# Patient Record
Sex: MALE | Race: WHITE | Employment: PART TIME | URBAN - METROPOLITAN AREA
[De-identification: names, ages, dates, MRNs, and addresses within clinical notes are randomized per-mention and may not be internally consistent; named-entity substitution may affect disease eponyms.]

---
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
## 2019-02-05 ENCOUNTER — HOSPITAL ENCOUNTER (EMERGENCY)
Facility: CLINIC | Age: 19
Discharge: HOME | End: 2019-02-05
Attending: EMERGENCY MEDICINE
Payer: COMMERCIAL

## 2019-02-05 VITALS
TEMPERATURE: 98.1 F | OXYGEN SATURATION: 99 % | SYSTOLIC BLOOD PRESSURE: 138 MMHG | HEIGHT: 70 IN | WEIGHT: 165 LBS | DIASTOLIC BLOOD PRESSURE: 78 MMHG | HEART RATE: 80 BPM | BODY MASS INDEX: 23.62 KG/M2 | RESPIRATION RATE: 16 BRPM

## 2019-02-05 DIAGNOSIS — R59.1 LYMPHADENOPATHY: Primary | ICD-10-CM

## 2019-02-05 PROCEDURE — 6370000000 HC RX 637 (ALT 250 FOR IP): Performed by: EMERGENCY MEDICINE

## 2019-02-05 PROCEDURE — 99283 EMERGENCY DEPT VISIT LOW MDM: CPT

## 2019-02-05 RX ORDER — IBUPROFEN 600 MG/1
600 TABLET ORAL ONCE
Status: COMPLETED | OUTPATIENT
Start: 2019-02-05 | End: 2019-02-05

## 2019-02-05 RX ORDER — IBUPROFEN 600 MG/1
600 TABLET ORAL EVERY 6 HOURS PRN
Qty: 120 TABLET | Refills: 0 | Status: SHIPPED | OUTPATIENT
Start: 2019-02-05

## 2019-02-05 RX ADMIN — IBUPROFEN 600 MG: 600 TABLET ORAL at 21:35

## 2019-02-05 ASSESSMENT — PAIN SCALES - GENERAL
PAINLEVEL_OUTOF10: 4
PAINLEVEL_OUTOF10: 4

## 2019-02-05 ASSESSMENT — PAIN DESCRIPTION - DESCRIPTORS: DESCRIPTORS: RADIATING

## 2019-02-05 ASSESSMENT — ENCOUNTER SYMPTOMS
EYES NEGATIVE: 1
GASTROINTESTINAL NEGATIVE: 1
RESPIRATORY NEGATIVE: 1

## 2019-02-05 ASSESSMENT — PAIN DESCRIPTION - PAIN TYPE: TYPE: ACUTE PAIN

## 2019-02-05 ASSESSMENT — PAIN DESCRIPTION - FREQUENCY: FREQUENCY: INTERMITTENT

## 2019-02-05 ASSESSMENT — PAIN DESCRIPTION - ORIENTATION: ORIENTATION: RIGHT

## 2019-02-05 ASSESSMENT — PAIN DESCRIPTION - LOCATION: LOCATION: ABDOMEN

## 2019-02-13 ENCOUNTER — HOSPITAL ENCOUNTER (OUTPATIENT)
Age: 19
Setting detail: SPECIMEN
Discharge: HOME | End: 2019-02-13
Payer: COMMERCIAL

## 2019-02-13 ENCOUNTER — OFFICE VISIT (OUTPATIENT)
Dept: FAMILY MEDICINE CLINIC | Age: 19
End: 2019-02-13
Payer: COMMERCIAL

## 2019-02-13 VITALS
HEIGHT: 70 IN | RESPIRATION RATE: 18 BRPM | HEART RATE: 74 BPM | BODY MASS INDEX: 22.9 KG/M2 | WEIGHT: 160 LBS | TEMPERATURE: 98 F | SYSTOLIC BLOOD PRESSURE: 116 MMHG | DIASTOLIC BLOOD PRESSURE: 74 MMHG

## 2019-02-13 DIAGNOSIS — Z00.00 ENCOUNTER FOR MEDICAL EXAMINATION TO ESTABLISH CARE: ICD-10-CM

## 2019-02-13 DIAGNOSIS — N50.811 RIGHT TESTICULAR PAIN: ICD-10-CM

## 2019-02-13 DIAGNOSIS — Z11.4 SCREENING FOR HIV (HUMAN IMMUNODEFICIENCY VIRUS): ICD-10-CM

## 2019-02-13 DIAGNOSIS — H53.9 VISUAL DISTURBANCE: ICD-10-CM

## 2019-02-13 DIAGNOSIS — F25.9 SCHIZO AFFECTIVE SCHIZOPHRENIA (HCC): ICD-10-CM

## 2019-02-13 DIAGNOSIS — R59.0 INGUINAL LYMPHADENOPATHY: Primary | ICD-10-CM

## 2019-02-13 DIAGNOSIS — R59.0 INGUINAL LYMPHADENOPATHY: ICD-10-CM

## 2019-02-13 LAB
BILIRUBIN URINE: NEGATIVE
COLOR: YELLOW
COMMENT UA: ABNORMAL
GLUCOSE URINE: NEGATIVE
KETONES, URINE: NEGATIVE
LEUKOCYTE ESTERASE, URINE: NEGATIVE
NITRITE, URINE: NEGATIVE
PH UA: 8.5 (ref 5–8)
PROTEIN UA: NEGATIVE
SPECIFIC GRAVITY UA: 1.02 (ref 1–1.03)
TURBIDITY: CLEAR
URINE HGB: NEGATIVE
UROBILINOGEN, URINE: NORMAL

## 2019-02-13 PROCEDURE — 99204 OFFICE O/P NEW MOD 45 MIN: CPT | Performed by: NURSE PRACTITIONER

## 2019-02-13 ASSESSMENT — ENCOUNTER SYMPTOMS
EYE ITCHING: 0
VOMITING: 0
TROUBLE SWALLOWING: 0
CONSTIPATION: 1
BLOOD IN STOOL: 0
EYE PAIN: 0
DIARRHEA: 0
COLOR CHANGE: 0
NAUSEA: 0
CHOKING: 0
CHEST TIGHTNESS: 0
PHOTOPHOBIA: 0
COUGH: 0
ABDOMINAL PAIN: 0
VOICE CHANGE: 0
EYE REDNESS: 0
SHORTNESS OF BREATH: 0
EYE DISCHARGE: 0

## 2019-02-13 ASSESSMENT — PATIENT HEALTH QUESTIONNAIRE - PHQ9
1. LITTLE INTEREST OR PLEASURE IN DOING THINGS: 0
2. FEELING DOWN, DEPRESSED OR HOPELESS: 0
SUM OF ALL RESPONSES TO PHQ QUESTIONS 1-9: 0
SUM OF ALL RESPONSES TO PHQ QUESTIONS 1-9: 0
SUM OF ALL RESPONSES TO PHQ9 QUESTIONS 1 & 2: 0

## 2019-02-14 LAB
C. TRACHOMATIS DNA ,URINE: NEGATIVE
N. GONORRHOEAE DNA, URINE: NEGATIVE
SPECIMEN DESCRIPTION: NORMAL

## 2019-02-19 DIAGNOSIS — R59.9 ENLARGED LYMPH NODE: Primary | ICD-10-CM

## 2019-02-20 ENCOUNTER — TELEPHONE (OUTPATIENT)
Dept: FAMILY MEDICINE CLINIC | Age: 19
End: 2019-02-20

## 2019-02-20 DIAGNOSIS — R59.0 INGUINAL LYMPHADENOPATHY: Primary | ICD-10-CM

## 2019-03-21 ENCOUNTER — HOSPITAL ENCOUNTER (OUTPATIENT)
Dept: ULTRASOUND IMAGING | Facility: CLINIC | Age: 19
Discharge: HOME | End: 2019-03-23
Payer: COMMERCIAL

## 2019-03-21 DIAGNOSIS — R59.0 INGUINAL LYMPHADENOPATHY: ICD-10-CM

## 2019-03-21 PROCEDURE — 76857 US EXAM PELVIC LIMITED: CPT

## 2019-04-05 ENCOUNTER — TELEPHONE (OUTPATIENT)
Dept: FAMILY MEDICINE CLINIC | Age: 19
End: 2019-04-05

## 2019-04-05 ENCOUNTER — OFFICE VISIT (OUTPATIENT)
Dept: FAMILY MEDICINE CLINIC | Age: 19
End: 2019-04-05
Payer: COMMERCIAL

## 2019-04-05 VITALS
DIASTOLIC BLOOD PRESSURE: 74 MMHG | HEART RATE: 76 BPM | BODY MASS INDEX: 21.52 KG/M2 | TEMPERATURE: 98.4 F | WEIGHT: 150 LBS | RESPIRATION RATE: 16 BRPM | SYSTOLIC BLOOD PRESSURE: 118 MMHG

## 2019-04-05 DIAGNOSIS — R10.84 ABDOMINAL PAIN, CHRONIC, GENERALIZED: Primary | ICD-10-CM

## 2019-04-05 DIAGNOSIS — G89.29 ABDOMINAL PAIN, CHRONIC, GENERALIZED: Primary | ICD-10-CM

## 2019-04-05 DIAGNOSIS — R59.0 INGUINAL LYMPHADENOPATHY: ICD-10-CM

## 2019-04-05 PROCEDURE — 99213 OFFICE O/P EST LOW 20 MIN: CPT | Performed by: NURSE PRACTITIONER

## 2019-04-05 NOTE — PROGRESS NOTES
tablet Take 1 tablet by mouth every 6 hours as needed for Pain 120 tablet 0     No current facility-administered medications for this visit. HPI    Adrienne Wright enlarged lymph node. Did have an ultrasound done which showed that was benign-appearing most likely inflammatory. Tells me that this node went down some and then he contracted flu 2 days ago and it returned. Has lost 10 pounds in the last month. Tells me he has been walking 1 mile to work every day. Working out more. Eating healthier. Feels like he gets full relief asked. Tells me that his bowels were normal.  Since he got a girlfriend. Now that she is in detention. He is constipated again. He believes that his bowels are stress related. Review of Systems   Constitutional: Negative for activity change, appetite change, chills, diaphoresis, fatigue and fever. HENT: Negative for dental problem, ear discharge, ear pain, trouble swallowing and voice change. Eyes: Positive for visual disturbance. Negative for photophobia, pain, discharge, redness and itching. Respiratory: Negative for cough, choking, chest tightness and shortness of breath. Cardiovascular: Negative for chest pain, palpitations and leg swelling. Gastrointestinal: Positive for constipation. Negative for abdominal pain, blood in stool, diarrhea, nausea and vomiting. Endocrine: Negative. Genitourinary: Positive for testicular pain (right at times. random. ). Negative for difficulty urinating, discharge, dysuria, flank pain, frequency, hematuria, penile pain and penile swelling. Musculoskeletal: Negative for arthralgias, gait problem and joint swelling. Skin: Negative for color change and rash. Allergic/Immunologic: Negative for immunocompromised state. Neurological: Positive for dizziness and headaches. Negative for syncope and light-headedness. Hematological: Negative for adenopathy. Does not bruise/bleed easily.    Psychiatric/Behavioral: Positive for dysphoric mood (HX. scizo affective. ). Negative for decreased concentration, sleep disturbance and suicidal ideas. The patient is not nervous/anxious. Objective:   Physical Exam   Constitutional: He is oriented to person, place, and time. He appears well-developed and well-nourished. No distress. HENT:   Head: Normocephalic and atraumatic. Right Ear: External ear normal.   Left Ear: External ear normal.   Nose: Nose normal.   Mouth/Throat: Oropharynx is clear and moist and mucous membranes are normal. No oropharyngeal exudate. Eyes: Pupils are equal, round, and reactive to light. Conjunctivae are normal. Right eye exhibits no discharge. Left eye exhibits no discharge. Neck: Normal range of motion. Neck supple. No JVD present. Cardiovascular: Normal rate and regular rhythm. No murmur heard. Pulses:       Carotid pulses are 2+ on the right side, and 2+ on the left side. Radial pulses are 2+ on the right side, and 2+ on the left side. Posterior tibial pulses are 2+ on the right side, and 2+ on the left side. Pulmonary/Chest: Effort normal and breath sounds normal. No accessory muscle usage. No respiratory distress. He has no decreased breath sounds. He has no wheezes. Abdominal: Soft. Bowel sounds are normal. He exhibits no distension. There is no tenderness. There is no rebound, no guarding and no CVA tenderness. Hernia confirmed negative in the right inguinal area and confirmed negative in the left inguinal area. Musculoskeletal: He exhibits no edema or tenderness. Lymphadenopathy: Inguinal adenopathy noted on the right side. No inguinal adenopathy noted on the left side. Right: No supraclavicular adenopathy present. Left: No supraclavicular adenopathy present. Neurological: He is alert and oriented to person, place, and time. He displays no atrophy. He exhibits normal muscle tone. Coordination and gait normal.   Skin: Skin is warm and dry. No rash noted. Psychiatric: He has a normal mood and affect. His speech is normal and behavior is normal. Cognition and memory are normal.   Nursing note and vitals reviewed. Assessment / Plan:     1. Abdominal pain, chronic, generalized      2. Inguinal lymphadenopathy      Metamucil. Follow up with eye doctor. Monitor lymph node. Has decreased in size since last visit. Follow-up in 4 weeks. Return in about 1 month (around 5/3/2019). Karen Mcgowan received counseling on the following healthy behaviors: nutrition and medication adherence  Reviewed prior labs and health maintenance. Continue current medications, diet and exercise. Discussed use, benefit, and side effects of prescribed medications. Barriers to medication compliance addressed. Patient given educational materials - see patient instructions. All patient questions answered. Patient voiced understanding.            Electronically signed by MEET Israel CNP on 4/14/2019 at 8:38 PM

## 2019-04-05 NOTE — TELEPHONE ENCOUNTER
Patient would like to know if he can come in for a nurse visit for immunizations. Please advise if it's ok to be a nurse visit and what needs ordered.

## 2019-04-05 NOTE — PATIENT INSTRUCTIONS
Metamucil. Follow up with eye doctor. Monitor lymph node. 4 weeks. Patient Education        Constipation: Care Instructions  Your Care Instructions    Constipation means that you have a hard time passing stools (bowel movements). People pass stools from 3 times a day to once every 3 days. What is normal for you may be different. Constipation may occur with pain in the rectum and cramping. The pain may get worse when you try to pass stools. Sometimes there are small amounts of bright red blood on toilet paper or the surface of stools. This is because of enlarged veins near the rectum (hemorrhoids). A few changes in your diet and lifestyle may help you avoid ongoing constipation. Your doctor may also prescribe medicine to help loosen your stool. Some medicines can cause constipation. These include pain medicines and antidepressants. Tell your doctor about all the medicines you take. Your doctor may want to make a medicine change to ease your symptoms. Follow-up care is a key part of your treatment and safety. Be sure to make and go to all appointments, and call your doctor if you are having problems. It's also a good idea to know your test results and keep a list of the medicines you take. How can you care for yourself at home? · Drink plenty of fluids, enough so that your urine is light yellow or clear like water. If you have kidney, heart, or liver disease and have to limit fluids, talk with your doctor before you increase the amount of fluids you drink. · Include high-fiber foods in your diet each day. These include fruits, vegetables, beans, and whole grains. · Get at least 30 minutes of exercise on most days of the week. Walking is a good choice. You also may want to do other activities, such as running, swimming, cycling, or playing tennis or team sports. · Take a fiber supplement, such as Citrucel or Metamucil, every day. Read and follow all instructions on the label.   · Schedule time each day for a bowel movement. A daily routine may help. Take your time having your bowel movement. · Support your feet with a small step stool when you sit on the toilet. This helps flex your hips and places your pelvis in a squatting position. · Your doctor may recommend an over-the-counter laxative to relieve your constipation. Examples are Milk of Magnesia and MiraLax. Read and follow all instructions on the label. Do not use laxatives on a long-term basis. When should you call for help? Call your doctor now or seek immediate medical care if:    · You have new or worse belly pain.     · You have new or worse nausea or vomiting.     · You have blood in your stools.    Watch closely for changes in your health, and be sure to contact your doctor if:    · Your constipation is getting worse.     · You do not get better as expected. Where can you learn more? Go to https://Netmagic Solutionspeluciaewteresa.Wellocities. org and sign in to your [employer] account. Enter [phone] in the GreenPocket box to learn more about \"Constipation: Care Instructions. \"     If you do not have an account, please click on the \"Sign Up Now\" link. Current as of: September 23, 2018  Content Version: 11.9  © 0861-3511 [employer]. Care instructions adapted under license by [facility] ([facility]). If you have questions about a medical condition or this instruction, always ask your healthcare professional. [place] [address] any warranty or liability for your use of this information.

## 2019-04-07 NOTE — TELEPHONE ENCOUNTER
[id number] Pippa Santamaria Let patient know we can discuss vaccine at next appointment and if he does not want them at that time, he can come back in for a nurse visit.

## 2019-04-14 ASSESSMENT — ENCOUNTER SYMPTOMS
EYE PAIN: 0
EYE REDNESS: 0
CHOKING: 0
PHOTOPHOBIA: 0
ABDOMINAL PAIN: 0
CHEST TIGHTNESS: 0
VOMITING: 0
TROUBLE SWALLOWING: 0
COUGH: 0
EYE ITCHING: 0
NAUSEA: 0
DIARRHEA: 0
EYE DISCHARGE: 0
COLOR CHANGE: 0
CONSTIPATION: 1
VOICE CHANGE: 0
BLOOD IN STOOL: 0
SHORTNESS OF BREATH: 0

## 2019-04-23 ENCOUNTER — TELEPHONE (OUTPATIENT)
Dept: INTERVENTIONAL RADIOLOGY/VASCULAR | Age: 19
End: 2019-04-23

## 2019-05-31 ENCOUNTER — OFFICE VISIT (OUTPATIENT)
Dept: FAMILY MEDICINE CLINIC | Age: 19
End: 2019-05-31
Payer: COMMERCIAL

## 2019-05-31 VITALS
TEMPERATURE: 97.6 F | DIASTOLIC BLOOD PRESSURE: 70 MMHG | BODY MASS INDEX: 21 KG/M2 | HEIGHT: 70 IN | SYSTOLIC BLOOD PRESSURE: 106 MMHG | HEART RATE: 72 BPM | WEIGHT: 146.7 LBS

## 2019-05-31 DIAGNOSIS — R63.4 WEIGHT LOSS: ICD-10-CM

## 2019-05-31 DIAGNOSIS — R59.1 LYMPHADENOPATHY, GENERALIZED: Primary | ICD-10-CM

## 2019-05-31 PROCEDURE — 99213 OFFICE O/P EST LOW 20 MIN: CPT | Performed by: NURSE PRACTITIONER

## 2019-05-31 NOTE — PATIENT INSTRUCTIONS
Patient Education        Swollen Lymph Nodes: Care Instructions  Your Care Instructions    Lymph nodes are small, bean-shaped glands throughout the body. They help your body fight germs and infections. Lymph nodes often swell when there is a problem such as an injury, infection, or tumor. · The nodes in your neck, under your chin, or behind your ears may swell when you have a cold or sore throat. · An injury or infection in a leg or foot can make the nodes in your groin swell. · Sometimes medicine can make lymph nodes swell, but this is rare. Treatment depends on what caused your nodes to swell. Usually the nodes return to normal size without a problem. Follow-up care is a key part of your treatment and safety. Be sure to make and go to all appointments, and call your doctor if you are having problems. It's also a good idea to know your test results and keep a list of the medicines you take. How can you care for yourself at home? · Take your medicines exactly as prescribed. Call your doctor if you think you are having a problem with your medicine. · Avoid irritation. ? Do not squeeze or pick at the lump. ? Do not stick a needle in it. · Prevent infection. Do not squeeze, drain, or puncture a painful lump. Doing this can irritate or inflame the lump, push any existing infection deeper into the skin, or cause severe bleeding. · Get extra rest. Slow down just a little from your usual routine. · Drink plenty of fluids, enough so that your urine is light yellow or clear like water. If you have kidney, heart, or liver disease and have to limit fluids, talk with your doctor before you increase the amount of fluids you drink. · Take an over-the-counter pain medicine, such as acetaminophen (Tylenol), ibuprofen (Advil, Motrin), or naproxen (Aleve). Read and follow all instructions on the label. · Do not take two or more pain medicines at the same time unless the doctor told you to.  Many pain medicines have

## 2019-06-05 ENCOUNTER — TELEPHONE (OUTPATIENT)
Dept: FAMILY MEDICINE CLINIC | Age: 19
End: 2019-06-05

## 2019-06-05 DIAGNOSIS — R07.9 CHEST PAIN, UNSPECIFIED TYPE: Primary | ICD-10-CM

## 2019-06-05 NOTE — LETTER
[address] [address]  Phone: [phone]  Fax: [phone]    MEET Prasad CNP        June 5, 2019     Patient: Christelle Carter   YOB: 2000   Date of Visit: 6/5/2019       To Whom it May Concern:    Pat Golden is a patient currently under my care. Please excuse [place] Caller from work on 6-4-19. He may return to work on 6/5/19. If you have any questions or concerns, please don't hesitate to call.     Sincerely,         MEET Prasad CNP   [place]

## 2019-06-05 NOTE — TELEPHONE ENCOUNTER
He was evaluated in the Er for this several times, but not in our office. Most likely anxiety related, however we can send a referral to cardiology. Please go to ER with worsening symptoms. [id number] Pippa Rodriguez for note.

## 2019-06-05 NOTE — TELEPHONE ENCOUNTER
Letter composed and placed up front for . Patient would like a recommendation for a cardiologist. Please advise.

## 2019-06-09 ASSESSMENT — ENCOUNTER SYMPTOMS
VOICE CHANGE: 0
EYE REDNESS: 0
COLOR CHANGE: 0
TROUBLE SWALLOWING: 0
SHORTNESS OF BREATH: 0
CHOKING: 0
PHOTOPHOBIA: 0
ABDOMINAL PAIN: 0
NAUSEA: 0
EYE ITCHING: 0
EYE DISCHARGE: 0
VOMITING: 0
BLOOD IN STOOL: 0
CHEST TIGHTNESS: 0
DIARRHEA: 0
EYE PAIN: 0
COUGH: 0
CONSTIPATION: 1

## 2019-08-07 ENCOUNTER — OFFICE VISIT (OUTPATIENT)
Dept: FAMILY MEDICINE CLINIC | Age: 19
End: 2019-08-07
Payer: COMMERCIAL

## 2019-08-07 VITALS
DIASTOLIC BLOOD PRESSURE: 76 MMHG | RESPIRATION RATE: 16 BRPM | BODY MASS INDEX: 20.81 KG/M2 | TEMPERATURE: 97.6 F | SYSTOLIC BLOOD PRESSURE: 116 MMHG | WEIGHT: 145 LBS | HEART RATE: 68 BPM

## 2019-08-07 DIAGNOSIS — F41.9 ANXIETY: Primary | ICD-10-CM

## 2019-08-07 DIAGNOSIS — R63.4 WEIGHT LOSS: ICD-10-CM

## 2019-08-07 PROCEDURE — 99213 OFFICE O/P EST LOW 20 MIN: CPT | Performed by: NURSE PRACTITIONER

## 2019-08-07 ASSESSMENT — ENCOUNTER SYMPTOMS
PHOTOPHOBIA: 0
VOICE CHANGE: 0
DIARRHEA: 0
EYE DISCHARGE: 0
CHEST TIGHTNESS: 0
TROUBLE SWALLOWING: 0
CHOKING: 0
VOMITING: 0
COUGH: 0
EYE PAIN: 0
ABDOMINAL PAIN: 0
CONSTIPATION: 0
EYE ITCHING: 0
NAUSEA: 0
COLOR CHANGE: 0
SHORTNESS OF BREATH: 0
BLOOD IN STOOL: 0
EYE REDNESS: 0

## 2019-08-07 NOTE — PATIENT INSTRUCTIONS
them not to disturb you. · Find a comfortable, quiet place. · Lie down on your back, or sit with your back straight. · Focus on your breathing. Make it slow and steady. · Breathe in through your nose. Breathe out through either your nose or mouth. · Breathe deeply, filling up the area between your navel and your rib cage. Breathe so that your belly goes up and down. · Do not hold your breath. · Breathe like this for 5 to 10 minutes. Notice the feeling of calmness throughout your whole body. As you continue to breathe slowly and deeply, relax by doing these next steps for another 5 to 10 minutes:  · Tighten and relax each muscle group in your body. Start at your toes, and work your way up to your head. · Imagine your muscle groups relaxing and getting heavy. · Empty your mind of all thoughts. · Let yourself relax more and more deeply. · Be aware of the state of calmness that surrounds you. · When your relaxation time is over, you can bring yourself back to alertness by moving your fingers and toes. Then move your hands and feet. And then move your entire body. Sometimes people fall asleep during relaxation. But they most often wake up soon. · Always give yourself time to return to full alertness before you drive a car. Wait to do anything that might cause an accident if you are not fully alert. Never play a relaxation tape while you drive a car. When should you call for help? Call 911 anytime you think you may need emergency care. For example, call if:    · You feel you cannot stop from hurting yourself or someone else. Keep the numbers for these national suicide hotlines: 6-460-280-TALK (6-285.665.6441) and 6-445-JCXBQTM (0-339.982.9258).  If you or someone you know talks about suicide or feeling hopeless, get help right away.    Watch closely for changes in your health, and be sure to contact your doctor if:    · You have new anxiety, or your anxiety gets worse.     · You have been feeling sad, depressed, or hopeless or have lost interest in things that you usually enjoy.     · You do not get better as expected. Where can you learn more? Go to https://Andegavia Cask Winespepiceweb.vidCoin. org and sign in to your [employer] account. Enter [phone] in the Attender box to learn more about \"Adjustment Disorder: Care Instructions. \"     If you do not have an account, please click on the \"Sign Up Now\" link. Current as of: June 28, 2018  Content Version: 12.0  © 3192-3075 [employer], Incorporated. Care instructions adapted under license by [facility] ([facility]). If you have questions about a medical condition or this instruction, always ask your healthcare professional. [address] any warranty or liability for your use of this information.

## 2020-05-11 ENCOUNTER — HOSPITAL ENCOUNTER (OUTPATIENT)
Dept: GENERAL RADIOLOGY | Age: 20
Discharge: HOME | End: 2020-05-13
Payer: COMMERCIAL

## 2020-05-11 ENCOUNTER — HOSPITAL ENCOUNTER (OUTPATIENT)
Age: 20
Discharge: HOME | End: 2020-05-13
Payer: COMMERCIAL

## 2020-05-11 PROCEDURE — 73090 X-RAY EXAM OF FOREARM: CPT

## 2020-05-27 ENCOUNTER — HOSPITAL ENCOUNTER (OUTPATIENT)
Dept: PHYSICAL THERAPY | Age: 20
Setting detail: THERAPIES SERIES
Discharge: HOME | End: 2020-05-27
Payer: COMMERCIAL

## 2020-05-27 PROCEDURE — 97162 PT EVAL MOD COMPLEX 30 MIN: CPT

## 2020-05-27 NOTE — CONSULTS
[x] [facility] [address]  [address]  [phone]  F: [phone]        Physical Therapy Upper Extremity Evaluation    Date:  2020  Patient: Kieran Hu  : 2000  MRN: [id number]  Physician: [facility] [address]: R forearm contusion S50. 11XD  Medical Diagnosis: R forearm Contusion S50. 11XD    Rehab Codes: K26.153, M79.641, R20.2, M79.644, M25.631, M25.641, M25.621  Onset Date: 2020   Next 's appt: 2020    Subjective:   CC: Pt reports while at work large box fell on his R forearm, corner of box hit his arm and dragged down entire forearm. Lots of pain and swelling immediately. Pain has decreased some since injury, but Pt cont w/numbness, aching, difficulty moving fingers. Index and thumb tips are completely numb, middle fingertip slightly numb, and numb post forearm. When hot arm \"gets dead. \"  Small movements take a lot of concentration, difficulty writing. Difficulty picking things up, unable to hold things for period of time. Hand swells up when hot or working arm. Ice decreases symptoms. Pain averages 3/10, only has about every 3 days. Weakness and numbness is getting worse. Hand is constantly shaking, gets finger twitching when fingers are extended. HPI: (onset date) 2020    PMHx: [] Unremarkable [] Diabetes [] HTN  [] Pacemaker   [] MI/Heart Problems [] Cancer [] Arthritis   [x] Other: Schizophrenia, Neurocardiogenic syncope               [x] Refer to full medical chart  In EPIC       Comorbidities:   [] Obesity [] Dialysis  [] Other:   [] Asthma/COPD [] Dementia [] Other:   [] Stroke [] Sleep apnea [] Other:   [] Vascular disease [] Rheumatic disease [] Other:     Tests: [x] X-Ray: [] MRI:  [] Other:   Xray report  FINDINGS:   No evidence of acute fracture or dislocation.  Questionable mild dorsal soft   tissue swelling.  Bone mineralization and alignment appear intact.        Medications: [x] Refer to full medical record [] None [] Other:  Allergies:      [x] Refer to full medical record [] None [x] Other: Penicillin    Function:  Hand Dominance  [x] Right  [] Left  Employer [employer]    Job Status []  Normal duty   [x] Light duty   [x] Off due to condition    []  Retired   [] Not employed   [] Disability  [] Other:  []  Return to work: Work activities/duties On 5 lb restriction, unable to accommodate so off work; plays drums, coin tricks with fingers-unable to do now       Pain:  [x] Yes  [] No Location: R forearm  Pain Rating: (0-10 scale) 0/10  Pain altered Tx:  [] Yes  [] No  Action:    Symptoms:  [] Improving [x] Worsening [] Same    Sleep: [x] OK    [] Disturbed    Objective:     ROM  °A/P END FEEL STRENGTH TESTS (+/-) Left Right Not Tested    Left Right  Left Right Drop Arm   []   Sit Shld Flex      Sulcus Sign   []   Sit Shld Abd      Apprehension   []   Sit Shld IR      Dejuans   []   Shoulder Flex    5 4- Speeds   []   Ext      Neer   []   ABD    5 4- Ureña    []   ER @ 0 45 90      Painful Arc   []   IR      Tinel  - []   Supraspinatus      Prayer stretch   +    Infraspinatus            Serratus Ant      9 hole peg test 22.09 sec 29.66 sec    Pectoralis            Lats            Mid Trap            Lower Trap            Elbow Flex. 5 4       Elbow Ext.    5 3+       Pronation 78° 70°  4+ 3+       Supination 80 82p  5 4-p       Wrist Flex. 88° 85°  4+ 3+       Wrist Ext. 87° 79°  4 3+       Rad.  Dev. 38° 35°  4+ 3       Ulnar Dev. 47° 39              115, 102, 98 80,75,60       Pinch     21,20,19 9,11,12       Lat pinch     19,20,20 15,14,13           OBSERVATION No Deficit Deficit Not Tested Comments   Forward Head [] [x] [] mild   Rounded Shoulders [] [x] [] mild   Kyphosis [] [] []    Scap Height/Position [] [] [x]    Winging [] [] [x]    SH Rhythm [] [] [x]    INSPECTION/PALPATION       SC/AC Joint [] [] [x]    Supraspinatus [] [] [x]    Biceps tendon/groove [] [] [x] Therapeutic Exercise   17844  [x] Iontophoresis: 4 mg/mL Dexamethasone Sodium Phosphate  mAmin  66040   [x] Therapeutic Activity  88374 [x] Vasopneumatic cold with compression  67122    [] Gait Training   68225 [x] Ultrasound   09077   [] Neuromuscular Re-education  40079 [x] Electrical Stimulation Unattended  80977   [x] Manual Therapy  33296 [] Electrical Stimulation Attended  29493   [x] Instruction in HEP  [] Lumbar/Cervical Traction  37322   [] Aquatic Therapy   50204 [x] Cold/hotpack    [x] Massage   05206      [] Dry Needling, 1 or 2 muscles  09604   [] Biofeedback, first 15 minutes   73291  [] Biofeedback, additional 15 minutes   49986 [] Dry Needling, 3 or more muscles  79519     []  Medication allergies reviewed for use of    Dexamethasone Sodium Phosphate 4mg/ml     with iontophoresis treatments. Pt is not allergic.        Frequency: 3 x/week for 9 visits    Todays Treatment:  Modalities: CP (large, w/extra towel) R forearm, at end of Rx  Precautions:  Exercises:  Exercise Reps/ Time Weight/ Level Comments                                 Other:    Specific Instructions for next treatment: begin wrist, hand ex, trial game ready forearm, consider ionto or US to aid healing      Evaluation Complexity:  History (Personal factors, comorbidities) [] 0 [x] 1-2 [] 3+   Exam (limitations, restrictions) [] 1-2 [] 3 [x] 4+   Clinical presentation (progression) [] Stable [x] Evolving  [] Unstable   Decision Making [] Low [x] Moderate [] High    [] Low Complexity [x] Moderate Complexity [] High Complexity       Treatment Charges: Mins Units   [x] Evaluation       []  Low       [x]  Moderate       []  High 32 1   [x]  Modalities CP 15 --   []  Ther Exercise     []  Manual Therapy     []  Ther Activities     []  Aquatics     []  Vasocompression     []  Other       TOTAL TREATMENT TIME: 47 min    Time in: 2489    Time Out: 2806    Electronically signed by: Jaqueline Yun, PT          Physician

## 2020-06-02 ENCOUNTER — HOSPITAL ENCOUNTER (OUTPATIENT)
Dept: PHYSICAL THERAPY | Age: 20
Setting detail: THERAPIES SERIES
Discharge: HOME | End: 2020-06-02
Payer: COMMERCIAL

## 2020-06-02 PROCEDURE — 97016 VASOPNEUMATIC DEVICE THERAPY: CPT

## 2020-06-02 PROCEDURE — 97110 THERAPEUTIC EXERCISES: CPT

## 2020-06-02 PROCEDURE — 97140 MANUAL THERAPY 1/> REGIONS: CPT

## 2020-06-02 NOTE — FLOWSHEET NOTE
[x] [facility]) [facility] &  Therapy  [address]  [phone]  F: [phone]        Physical Therapy Daily Treatment Note    Date:  2020  Patient Name:  Matt Guevara    :  2000  MRN: [id number]  Physician: Deborah Mendoza                             Insurance:  (9 Rx auth until 2020)  Medical Diagnosis: R forearm Contusion S50. 11XD                         Rehab Codes: R73.010, M79.641, R20.2, M79.644, M25.631, M25.641, M25.621  Onset Date: 2020                      Next 's appt: 2020  Visit# / total visits: 2/9     Cancels/No Shows: 0/2    Subjective:    Pain:  [] Yes  [x] No Location: R forearm Pain Rating: (0-10 scale) 0/10  Pain altered Tx:  [x] No  [] Yes  Action:  Comments: Pt reports arm isn't painful at start of Rx, but is shaky, especially today as it is so hot out.       Objective:  Modalities: game ready R elbow/forearm (ankle cuff), low pressure, 34° at end of Rx-changed to no pressure after 10 min  Precautions:  Exercises: R forearm  Exercise Reps/ Time Weight/ Level Comments   UBE 2min ea L1 Fwd, Retro, hand cramping    Scap retractions  10x 5sec    Post shoulder rolls 10x  Up, back, down         Open/close fingers 15x     Fingertips to thumb 5x     Thumb flex/ext  10x  To base of pinky, then out to side   Thumb abd/add 10x     Pronation/supination 10x  Hand visibly shaking    Wrist AROM 10x ea  Flex, ext, rad dev antigravity off ramp-difficulty w/end range ext when doing flex   Towel squeezes 10x     Foam -squeezes 10x     -pinch 15x     -Lat pinch 15x     Wrist maze 6x     Bicep curls  20x 1.5lbs Soft  hand weight, \"pinching stress in bones\" points prox to index finger and thumb   Manual 8 min  Gentle MFR (1 handed, inf push, then sup push, 3 min ea), gentle soft tissue massage-burning sensation in post elbow, locking up pinkee   Other:       Treatment Charges: Mins Units Time In/Out   []  Modalities        [x]  Ther

## 2020-06-04 ENCOUNTER — HOSPITAL ENCOUNTER (OUTPATIENT)
Dept: PHYSICAL THERAPY | Age: 20
Setting detail: THERAPIES SERIES
Discharge: HOME | End: 2020-06-04
Payer: COMMERCIAL

## 2020-06-04 PROCEDURE — 97016 VASOPNEUMATIC DEVICE THERAPY: CPT

## 2020-06-04 PROCEDURE — 97140 MANUAL THERAPY 1/> REGIONS: CPT

## 2020-06-04 PROCEDURE — 97110 THERAPEUTIC EXERCISES: CPT

## 2020-06-04 NOTE — FLOWSHEET NOTE
[x] [facility] &  Therapy  [address]  [phone]  F: [phone]        Physical Therapy Daily Treatment Note    Date:  2020  Patient Name:  Maria Ines Ruffin    :  2000  MRN: [id number]  Physician: Kelsey [address]: WC (9 Rx auth until 2020)  Medical Diagnosis: R forearm Contusion S50. 11XD                         Rehab Codes: Y25.071, M79.641, R20.2, M79.644, M25.631, M25.641, M25.621  Onset Date: 2020                      Next 's appt: 2020  Visit# / total visits: 3/9     Cancels/No Shows: 0/2    Subjective:    Pain:  [] Yes  [x] No Location: R forearm Pain Rating: (0-10 scale) 0/10  Pain altered Tx:  [x] No  [] Yes  Action:  Comments: Pt arrived 10 min late for appt. Pt reports arm is pretty good today, no pain. Rest of day after last Rx and yesterday \"arm was dead. \"  Pt reports dropped his razor yesterday, just fell out of his hand when he wasn't paying attention.       Objective:  Modalities: game ready R elbow/forearm (ankle cuff), low pressure, 34° at end of Rx  Precautions:  Exercises: R forearm  Exercise Reps/ Time Weight/ Level Comments   UBE 2min ea L1 Fwd, Retro   Scap retractions  10x 5sec    Post shoulder rolls 20x  Up, back, down, progressed reps 6/4         Open/close fingers 15x     Fingertips to thumb 5x     Thumb flex/ext  15x  To base of pinky, then out to side, progressed reps 6/4   Thumb abd/add 15x  progressed reps 6/4   Pronation/supination 15x  Hand visibly shaking, progressed reps 6/4   Wrist AROM 15x ea  Flex, ext, rad dev antigravity off ramp, progressed reps 6/4   Towel squeezes      Foam -squeezes 15x yell progressed reps 6/4-Pt reports difficulty rotating sponge, \"my index finger is especially unresponsive\"   -pinch 15x yell    -Lat pinch 15x yell    Wrist maze 6x     Bicep curls  20x 1.5lbs Soft  hand weight   Ulnar Nerve Mobs, Flossing  15x ea  Arm flexed purpose, effects of game ready  [] Demonstrates understanding. [] Needs review. [] Demonstrates/verbalizes HEP/Ed previously given. Access Code: [email or web address]   URL: [email or web address]   Date: 06/04/2020   Prepared by: Nader Ring     Exercises   Ulnar Nerve Mobilization - Low Level - 20 reps - 1 sets - 3x daily - 7x weekly   Radial Nerve Flossing - 20 reps - 1 sets - 3x daily - 7x weekly   Ulnar Nerve Flossing - 20 reps - 1 sets - 3x daily - 7x weekly   Musculocutaneous Nerve Mobilization - 20 reps - 1 sets - 3x daily - 7x weekly   Towel Roll Squeeze - 20 reps - 1 sets - 3seconds hold - 3x daily - 7x weekly   Seated Forearm Pronation and Supination AROM - 20 reps - 1 sets - 3x daily - 7x weekly   Wrist Flexion AROM - 20 reps - 1 sets - 3x daily - 7x weekly   Wrist Extension AROM - 20 reps - 1 sets - 3x daily - 7x weekly   Wrist Radial Deviation AROM - 20 reps - 1 sets - 3x daily - 7x weekly        Plan: [x] Continue current frequency toward long and short term goals.     [x] Specific Instructions for subsequent treatments: monitor response to game ready, consider ionto or US to aid healing; add door stretch      Time In:1110            Time Out: 1216    Electronically signed by:  Madonna Ashton, PT

## 2020-06-08 ENCOUNTER — HOSPITAL ENCOUNTER (OUTPATIENT)
Dept: PHYSICAL THERAPY | Age: 20
Setting detail: THERAPIES SERIES
Discharge: HOME | End: 2020-06-08
Payer: COMMERCIAL

## 2020-06-11 ENCOUNTER — HOSPITAL ENCOUNTER (OUTPATIENT)
Dept: PHYSICAL THERAPY | Age: 20
Setting detail: THERAPIES SERIES
Discharge: HOME | End: 2020-06-11
Payer: COMMERCIAL

## 2020-06-11 PROCEDURE — 97140 MANUAL THERAPY 1/> REGIONS: CPT

## 2020-06-11 PROCEDURE — 97016 VASOPNEUMATIC DEVICE THERAPY: CPT

## 2020-06-11 PROCEDURE — 97110 THERAPEUTIC EXERCISES: CPT

## 2020-06-11 NOTE — FLOWSHEET NOTE
Demonstrates/verbalizes HEP/Ed previously given. Access Code: [email or web address]   URL: [email or web address]   Date: 06/04/2020   Prepared by: Whit Elmore     Exercises   Ulnar Nerve Mobilization - Low Level - 20 reps - 1 sets - 3x daily - 7x weekly   Radial Nerve Flossing - 20 reps - 1 sets - 3x daily - 7x weekly   Ulnar Nerve Flossing - 20 reps - 1 sets - 3x daily - 7x weekly   Musculocutaneous Nerve Mobilization - 20 reps - 1 sets - 3x daily - 7x weekly   Towel Roll Squeeze - 20 reps - 1 sets - 3seconds hold - 3x daily - 7x weekly   Seated Forearm Pronation and Supination AROM - 20 reps - 1 sets - 3x daily - 7x weekly   Wrist Flexion AROM - 20 reps - 1 sets - 3x daily - 7x weekly   Wrist Extension AROM - 20 reps - 1 sets - 3x daily - 7x weekly   Wrist Radial Deviation AROM - 20 reps - 1 sets - 3x daily - 7x weekly        Plan: [x] Continue current frequency toward long and short term goals.     [x] Specific Instructions for subsequent treatments: monitor response to game ready, consider ionto or US to aid healing; add door stretch      Time In: 6129          Time Out: 8349    Electronically signed by:  Dereje Duggan PTA

## 2020-06-12 ENCOUNTER — HOSPITAL ENCOUNTER (OUTPATIENT)
Dept: PHYSICAL THERAPY | Age: 20
Setting detail: THERAPIES SERIES
Discharge: HOME | End: 2020-06-12
Payer: COMMERCIAL

## 2020-06-12 PROCEDURE — 97016 VASOPNEUMATIC DEVICE THERAPY: CPT

## 2020-06-12 PROCEDURE — 97110 THERAPEUTIC EXERCISES: CPT

## 2020-06-12 PROCEDURE — 97140 MANUAL THERAPY 1/> REGIONS: CPT

## 2020-06-12 NOTE — FLOWSHEET NOTE
ready  [x] Demonstrates understanding. [x] Needs review. [] Demonstrates/verbalizes HEP/Ed previously given. Access Code: [email or web address]   URL: [employer]   Date: 06/04/2020   Prepared by: Mancel Raw     Exercises   Ulnar Nerve Mobilization - Low Level - 20 reps - 1 sets - 3x daily - 7x weekly   Radial Nerve Flossing - 20 reps - 1 sets - 3x daily - 7x weekly   Ulnar Nerve Flossing - 20 reps - 1 sets - 3x daily - 7x weekly   Musculocutaneous Nerve Mobilization - 20 reps - 1 sets - 3x daily - 7x weekly   Towel Roll Squeeze - 20 reps - 1 sets - 3seconds hold - 3x daily - 7x weekly   Seated Forearm Pronation and Supination AROM - 20 reps - 1 sets - 3x daily - 7x weekly   Wrist Flexion AROM - 20 reps - 1 sets - 3x daily - 7x weekly   Wrist Extension AROM - 20 reps - 1 sets - 3x daily - 7x weekly   Wrist Radial Deviation AROM - 20 reps - 1 sets - 3x daily - 7x weekly        Plan: [x] Continue current frequency toward long and short term goals.     [x] Specific Instructions for subsequent treatments: monitor response to game ready, consider ionto or US to aid healing; increase weight and resistance      Time In: 1405          Time Out: 1523    Electronically signed by:  Kellee Negro PTA

## 2020-06-17 ENCOUNTER — HOSPITAL ENCOUNTER (OUTPATIENT)
Dept: PHYSICAL THERAPY | Age: 20
Setting detail: THERAPIES SERIES
Discharge: HOME | End: 2020-06-17
Payer: COMMERCIAL

## 2020-06-18 ENCOUNTER — HOSPITAL ENCOUNTER (OUTPATIENT)
Dept: PHYSICAL THERAPY | Age: 20
Setting detail: THERAPIES SERIES
Discharge: HOME | End: 2020-06-18
Payer: COMMERCIAL

## 2020-06-18 PROCEDURE — 97110 THERAPEUTIC EXERCISES: CPT

## 2020-06-18 PROCEDURE — 97140 MANUAL THERAPY 1/> REGIONS: CPT

## 2020-06-18 PROCEDURE — 97016 VASOPNEUMATIC DEVICE THERAPY: CPT

## 2020-06-19 ENCOUNTER — HOSPITAL ENCOUNTER (OUTPATIENT)
Dept: PHYSICAL THERAPY | Age: 20
Setting detail: THERAPIES SERIES
Discharge: HOME | End: 2020-06-19
Payer: COMMERCIAL

## 2020-06-19 PROCEDURE — 97110 THERAPEUTIC EXERCISES: CPT

## 2020-06-19 PROCEDURE — 97016 VASOPNEUMATIC DEVICE THERAPY: CPT

## 2020-06-19 NOTE — FLOWSHEET NOTE
palm up   Musculocutaneous Neve Mobs  15x  Thumb inside fingers, ulnar deviated, extend elbow & shoulder   Manual   Gentle MFR (1 handed, inf push, then sup push, 3 min ea), gentle soft tissue massage-burning sensation in wrist, medial forearm to ring, pinky fingers cramping up   Other:       Treatment Charges: Mins Units Time In/Out   []  Modalities      [x]  Ther Exercise 15 1 1438-4015   []  Manual Therapy      []  Ther Activities      []  Aquatics      [x]  Vasocompression 10 1 5600-1797   []  Cervical Traction      Total Treatment time 25 mins 2           Assessment: [x] Progressing toward goals. Limited exercises this date secondary to late arrival this date. Game ready for 10 minutes after exercises. [x] No change. Numbness and tightness still noted. [] Other:  [x] Patient would continue to benefit from skilled physical therapy services in order to: increase sensation and strength R arm, hand, increase functional use R UE, and to return Pt to normal duty at work. STG: (to be met in 9 treatments)  1. ? Pain: No pain R forearm for 1 week  2. ? ROM: R wrist ext 84°, ulnar dev 44°, finger extension WFL without finger twitching  3. ? Strength: R shoulder 4/5, R elbow flex 4+/5, ext 4/5, pron 4-/5, supin 4/5, wrist 4-/5,  90 lbs, pinch 15 lbs, lat pinch 17 lbs  4. ? Function: UEFI 45% loss of UE function  5. Pt report increased ability to pick things up, hold things, write   6. Patient to be independent with home exercise program as demonstrated by performance with correct form without cues.       Pt. Education:  [x] Yes  [] No  [x] Reviewed Prior HEP/Ed  Method of Education: [x] Verbal  [x] Demo  [] Written  Comprehension of Education:  [x] Verbalizes understanding-purpose, correct technique ex; purpose, effects of game ready  [x] Demonstrates understanding. [x] Needs review. [] Demonstrates/verbalizes HEP/Ed previously given. Access Code: [email or web address]   URL: [employer]/   Date:

## 2020-06-23 ENCOUNTER — HOSPITAL ENCOUNTER (OUTPATIENT)
Dept: PHYSICAL THERAPY | Age: 20
Setting detail: THERAPIES SERIES
Discharge: HOME | End: 2020-06-23
Payer: COMMERCIAL

## 2020-06-23 PROCEDURE — 97110 THERAPEUTIC EXERCISES: CPT

## 2020-06-23 PROCEDURE — 97016 VASOPNEUMATIC DEVICE THERAPY: CPT

## 2020-06-23 NOTE — FLOWSHEET NOTE
[x] [facility] [address]  [address]  [phone]  F: [phone]        Physical Therapy Daily Treatment Note    Date:  2020  Patient Name:  Kieran Hu    :  2000  MRN: [id number]  Physician: Kelsey [address]: WC (9 Rx auth until 2020)  Medical Diagnosis: R forearm Contusion S50. 11XD                         Rehab Codes: S00.623, M79.641, R20.2, M79.644, M25.631, M25.641, M25.621  Onset Date: 2020                      Next 's appt: 2020  Visit# / total visits:      Cancels/No Shows: 2    Subjective:    Pain:  [] Yes  [x] No Location: R forearm Pain Rating: (0-10 scale) 0/10  Pain altered Tx:  [x] No  [] Yes  Action:  Comments: Patient notes he is still having numbness in fingers and weakness. Seen doctor this afternoon and states that he can go back to work on Monday.      Objective:  Modalities: game ready R elbow/forearm (XL ankle cuff), low pressure, 34° for 10 minutes at end of Rx  Precautions:  Exercises: R forearm  Exercise Reps/ Time Weight/ Level Comments   UBE  2 mins ea L1 Fwd, Retro   Corner stretch    3x 20 sec    Scap retractions     10x 5sec    Post shoulder rolls   20x  Up, back, down         Open/close fingers   15x     Fingertips to thumb   5x     Thumb flex/ext    15x  To base of pinky, then out to side, some forearm shakiness   Thumb abd/add  15x     Pronation/supination   15x  Hand visibly shaking   Wrist AROM   15x ea 1 lb Flex 1 pound, ext 2 pound, rad dev 1 pound antigravity off ramp   Towel squeezes      Foam -squeezes   15x red Pt reports difficulty rotating sponge, \"my index finger is especially unresponsive\"   -pinch     15x red    -Lat pinch     15x red    Wrist maze    6x     Bicep curls    20x 2 pounds Dumbbell    Ulnar Nerve Mobs, Flossing   15x ea  Arm flexed 90°, palm down, then palm to ear; & 90° abd, elbow/palm flexed, extend elbow, palm   [email or web address]   Date: 06/04/2020   Prepared by: Jose Carlos Acuña     Exercises   Ulnar Nerve Mobilization - Low Level - 20 reps - 1 sets - 3x daily - 7x weekly   Radial Nerve Flossing - 20 reps - 1 sets - 3x daily - 7x weekly   Ulnar Nerve Flossing - 20 reps - 1 sets - 3x daily - 7x weekly   Musculocutaneous Nerve Mobilization - 20 reps - 1 sets - 3x daily - 7x weekly   Towel Roll Squeeze - 20 reps - 1 sets - 3seconds hold - 3x daily - 7x weekly   Seated Forearm Pronation and Supination AROM - 20 reps - 1 sets - 3x daily - 7x weekly   Wrist Flexion AROM - 20 reps - 1 sets - 3x daily - 7x weekly   Wrist Extension AROM - 20 reps - 1 sets - 3x daily - 7x weekly   Wrist Radial Deviation AROM - 20 reps - 1 sets - 3x daily - 7x weekly        Plan: [x] Continue current frequency toward long and short term goals.     [x] Specific Instructions for subsequent treatments: monitor response to game ready, consider ionto or US to aid healing; increase weight and resistance      Time In: 1545          Time Out: 1630    Electronically signed by:  Julio Pratt PTA

## 2020-06-26 ENCOUNTER — HOSPITAL ENCOUNTER (OUTPATIENT)
Dept: PHYSICAL THERAPY | Age: 20
Setting detail: THERAPIES SERIES
Discharge: HOME | End: 2020-06-26
Payer: COMMERCIAL

## 2020-06-26 PROCEDURE — 97110 THERAPEUTIC EXERCISES: CPT

## 2020-06-26 PROCEDURE — 97016 VASOPNEUMATIC DEVICE THERAPY: CPT

## 2020-06-26 NOTE — FLOWSHEET NOTE
[x] [facility] [facility]  [address]  [phone]  F: [phone]        Physical Therapy Daily Treatment Note    Date:  2020  Patient Name:  Yara Tang    :  2000  MRN: [id number]  Physician: Kelsey [address]: WC (9 Rx auth until 2020)  Medical Diagnosis: R forearm Contusion S50. 11XD                         Rehab Codes: D22.467, M79.641, R20.2, M79.644, M25.631, M25.641, M25.621  Onset Date: 2020                      Next 's appt: 2020  Visit# / total visits:      Cancels/No Shows:     Subjective:    Pain:  [] Yes  [x] No Location: R forearm Pain Rating: (0-10 scale) 0/10  Pain altered Tx:  [x] No  [] Yes  Action:  Comments: Notes that he did have some tension this morning but notes that since the weather changed he is feeling really good. States that he has seen a  and sent him to an orthopedic surgeon at [facility], which he seen today. 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 Doctor also ordered an EMG. Notes will see after EMG if physical therapy is resumed.      Objective:  Modalities: game ready R elbow/forearm (XL ankle cuff), low pressure, 34° for 10 minutes at end of Rx  Precautions:  Exercises: R forearm  Exercise Reps/ Time Weight/ Level Comments   UBE    2 mins ea L1 Fwd, Retro   Corner stretch    3x 20 sec    Scap retractions     10x 5sec    Post shoulder rolls 20x  Up, back, down         Open/close fingers   15x     Fingertips to thumb   5x     Thumb flex/ext    15x  To base of pinky, then out to side, some forearm shakiness   Thumb abd/add  15x     Pronation/supination   15x  Hand visibly shaking   Wrist AROM   15x ea 1 lb Flex 1 pound, ext 3 pound, rad dev 1 pound antigravity off ramp   Towel squeezes      Foam -squeezes   15x red Pt reports difficulty rotating sponge, \"my index finger is especially unresponsive\"   -pinch     15x red    -Lat pinch     15x

## 2021-09-10 NOTE — TELEPHONE ENCOUNTER
Becky Davalos at [facility] office informed that dr Adalgisa Christianson reviewed biopsy requested and recommending pt to have follow up in 6 weeks. If lymph node bigger at that time, then biopsy can be completed. Becky Davalos requested to response to be sent over. Faxed.
Ambulatory

## 2023-08-13 ENCOUNTER — HOSPITAL ENCOUNTER (EMERGENCY)
Facility: CLINIC | Age: 23
Discharge: HOME | End: 2023-08-14
Attending: SPECIALIST

## 2023-08-13 DIAGNOSIS — J02.9 VIRAL PHARYNGITIS: Primary | ICD-10-CM

## 2023-08-13 LAB
S PYO AG THROAT QL: NEGATIVE
SPECIMEN SOURCE: NORMAL

## 2023-08-13 PROCEDURE — 87880 STREP A ASSAY W/OPTIC: CPT

## 2023-08-13 PROCEDURE — 99283 EMERGENCY DEPT VISIT LOW MDM: CPT

## 2023-08-13 PROCEDURE — 6370000000 HC RX 637 (ALT 250 FOR IP): Performed by: SPECIALIST

## 2023-08-13 RX ORDER — ACETAMINOPHEN 500 MG
1000 TABLET ORAL ONCE
Status: COMPLETED | OUTPATIENT
Start: 2023-08-13 | End: 2023-08-13

## 2023-08-13 RX ADMIN — ACETAMINOPHEN 1000 MG: 500 TABLET ORAL at 22:51

## 2023-08-14 VITALS
RESPIRATION RATE: 18 BRPM | BODY MASS INDEX: 22.9 KG/M2 | HEIGHT: 70 IN | TEMPERATURE: 99.1 F | DIASTOLIC BLOOD PRESSURE: 73 MMHG | HEART RATE: 110 BPM | SYSTOLIC BLOOD PRESSURE: 120 MMHG | WEIGHT: 160 LBS | OXYGEN SATURATION: 96 %

## 2023-08-14 ASSESSMENT — ENCOUNTER SYMPTOMS
DIARRHEA: 0
ABDOMINAL PAIN: 0
VOMITING: 0
COUGH: 1
SORE THROAT: 1
WHEEZING: 0
TROUBLE SWALLOWING: 1
SHORTNESS OF BREATH: 0

## 2023-08-14 NOTE — DISCHARGE INSTRUCTIONS
PLEASE RETURN TO THE EMERGENCY DEPARTMENT IMMEDIATELY if your symptoms worsen in anyway or in 1-2 days if not improved for re-evaluation. You should immediately return to the ER for symptoms such as new or worsening pain, difficulty breathing or swallowing, a change in your voice, a feeling of passing out, light headed, dizziness, chest pain, headache, abdominal pain or vomiting. Take your medication as indicated and prescribed. If you are given an antibiotic then, make sure you get the prescription filled and take the antibiotics until finished. Please understand that at this time there is no evidence for a more serious underlying process, but that early in the process of an illness or injury, an emergency department workup can be falsely reassuring. You should contact your family doctor within the next 48 hours for a follow up appointment. You may take Tylenol and/or Motrin as needed for Pain. You may also gargle with salt water as needed. [address]!!!    From [facility] ([facility]) and [address] [place] [address] Emergency Services    On behalf of the Emergency Department staff at [facility]), I would like to thank you for giving us the opportunity to address your health care needs and concerns. We hope that during your visit, our service was delivered in a professional and caring manner. Please keep [facility]) in mind as we walk with you down the path to your own personal wellness. Please expect an automated text message or email from us so we can ask a few questions about your health and progress. Based on your answers, a clinician may call you back to offer help and instructions. Please understand that early in the process of an illness or injury, an emergency department workup can be falsely reassuring. If you notice any worsening, changing or persistent symptoms please call your family doctor or return to the ER immediately. Tell us how we did during your visit at [email or web address]

## 2023-08-14 NOTE — ED NOTES
Pt presents to ED via private auto with c/o sore throat and fever, onset this morning. Pt states he has not taken any medication today for his symptoms. Pt afebrile, vitals stable.  Pt able to ambulate without assist.      Michi Resendez RN  08/13/23 2677

## 2023-08-16 ENCOUNTER — HOSPITAL ENCOUNTER (EMERGENCY)
Facility: CLINIC | Age: 23
Discharge: HOME | End: 2023-08-16
Attending: EMERGENCY MEDICINE

## 2023-08-16 VITALS
HEART RATE: 99 BPM | DIASTOLIC BLOOD PRESSURE: 79 MMHG | RESPIRATION RATE: 18 BRPM | TEMPERATURE: 98.7 F | SYSTOLIC BLOOD PRESSURE: 107 MMHG | OXYGEN SATURATION: 98 %

## 2023-08-16 DIAGNOSIS — J02.9 ACUTE PHARYNGITIS, UNSPECIFIED ETIOLOGY: Primary | ICD-10-CM

## 2023-08-16 LAB
ANION GAP SERPL CALCULATED.3IONS-SCNC: 14 MMOL/L (ref 9–17)
BASOPHILS # BLD: 0.2 K/UL (ref 0–0.2)
BASOPHILS NFR BLD: 2 % (ref 0–2)
BUN SERPL-MCNC: 9 MG/DL (ref 6–20)
CALCIUM SERPL-MCNC: 9.3 MG/DL (ref 8.6–10.4)
CHLORIDE SERPL-SCNC: 98 MMOL/L (ref 98–107)
CO2 SERPL-SCNC: 25 MMOL/L (ref 20–31)
CREAT SERPL-MCNC: 0.8 MG/DL (ref 0.7–1.2)
EOSINOPHIL # BLD: 0 K/UL (ref 0–0.4)
EOSINOPHILS RELATIVE PERCENT: 0 % (ref 1–4)
ERYTHROCYTE [DISTWIDTH] IN BLOOD BY AUTOMATED COUNT: 12.7 % (ref 12.5–15.4)
GFR SERPL CREATININE-BSD FRML MDRD: >60 ML/MIN/1.73M2
GLUCOSE SERPL-MCNC: 88 MG/DL (ref 70–99)
HCT VFR BLD AUTO: 43.1 % (ref 41–53)
HETEROPH AB BLD QL IA: NEGATIVE
HGB BLD-MCNC: 14.9 G/DL (ref 13.5–17.5)
LYMPHOCYTES NFR BLD: 2.1 K/UL (ref 1–4.8)
LYMPHOCYTES RELATIVE PERCENT: 20 % (ref 24–44)
MCH RBC QN AUTO: 30.9 PG (ref 26–34)
MCHC RBC AUTO-ENTMCNC: 34.5 G/DL (ref 31–37)
MCV RBC AUTO: 89.7 FL (ref 80–100)
MONOCYTES NFR BLD: 1.2 K/UL (ref 0.1–1.2)
MONOCYTES NFR BLD: 12 % (ref 2–11)
NEUTROPHILS NFR BLD: 66 % (ref 36–66)
NEUTS SEG NFR BLD: 6.9 K/UL (ref 1.8–7.7)
PLATELET # BLD AUTO: 219 K/UL (ref 140–450)
PMV BLD AUTO: 7.6 FL (ref 6–12)
POTASSIUM SERPL-SCNC: 3.8 MMOL/L (ref 3.7–5.3)
RBC # BLD AUTO: 4.81 M/UL (ref 4.5–5.9)
S PYO AG THROAT QL: NEGATIVE
SODIUM SERPL-SCNC: 137 MMOL/L (ref 135–144)
SPECIMEN SOURCE: NORMAL
WBC OTHER # BLD: 10.4 K/UL (ref 3.5–11)

## 2023-08-16 PROCEDURE — 2580000003 HC RX 258: Performed by: NURSE PRACTITIONER

## 2023-08-16 PROCEDURE — 36415 COLL VENOUS BLD VENIPUNCTURE: CPT

## 2023-08-16 PROCEDURE — 96374 THER/PROPH/DIAG INJ IV PUSH: CPT

## 2023-08-16 PROCEDURE — 87081 CULTURE SCREEN ONLY: CPT

## 2023-08-16 PROCEDURE — 87529 HSV DNA AMP PROBE: CPT

## 2023-08-16 PROCEDURE — 87651 STREP A DNA AMP PROBE: CPT

## 2023-08-16 PROCEDURE — 80048 BASIC METABOLIC PNL TOTAL CA: CPT

## 2023-08-16 PROCEDURE — 86308 HETEROPHILE ANTIBODY SCREEN: CPT

## 2023-08-16 PROCEDURE — 99284 EMERGENCY DEPT VISIT MOD MDM: CPT

## 2023-08-16 PROCEDURE — 85025 COMPLETE CBC W/AUTO DIFF WBC: CPT

## 2023-08-16 PROCEDURE — 6360000002 HC RX W HCPCS: Performed by: NURSE PRACTITIONER

## 2023-08-16 PROCEDURE — 6370000000 HC RX 637 (ALT 250 FOR IP): Performed by: NURSE PRACTITIONER

## 2023-08-16 RX ORDER — DEXAMETHASONE SODIUM PHOSPHATE 10 MG/ML
10 INJECTION, SOLUTION INTRAMUSCULAR; INTRAVENOUS ONCE
Status: COMPLETED | OUTPATIENT
Start: 2023-08-16 | End: 2023-08-16

## 2023-08-16 RX ORDER — AZITHROMYCIN 250 MG/1
500 TABLET, FILM COATED ORAL DAILY
Qty: 20 TABLET | Refills: 0 | Status: SHIPPED | OUTPATIENT
Start: 2023-08-16 | End: 2023-08-26

## 2023-08-16 RX ORDER — AZITHROMYCIN 250 MG/1
500 TABLET, FILM COATED ORAL ONCE
Status: COMPLETED | OUTPATIENT
Start: 2023-08-16 | End: 2023-08-16

## 2023-08-16 RX ORDER — 0.9 % SODIUM CHLORIDE 0.9 %
1000 INTRAVENOUS SOLUTION INTRAVENOUS ONCE
Status: COMPLETED | OUTPATIENT
Start: 2023-08-16 | End: 2023-08-16

## 2023-08-16 RX ADMIN — AZITHROMYCIN DIHYDRATE 500 MG: 250 TABLET ORAL at 20:43

## 2023-08-16 RX ADMIN — SODIUM CHLORIDE 1000 ML: 9 INJECTION, SOLUTION INTRAVENOUS at 19:26

## 2023-08-16 RX ADMIN — DEXAMETHASONE SODIUM PHOSPHATE 10 MG: 10 INJECTION, SOLUTION INTRAMUSCULAR; INTRAVENOUS at 19:25

## 2023-08-16 ASSESSMENT — PAIN SCALES - GENERAL
PAINLEVEL_OUTOF10: 6
PAINLEVEL_OUTOF10: 2

## 2023-08-16 ASSESSMENT — PAIN - FUNCTIONAL ASSESSMENT
PAIN_FUNCTIONAL_ASSESSMENT: 0-10
PAIN_FUNCTIONAL_ASSESSMENT: 0-10

## 2023-08-16 ASSESSMENT — ENCOUNTER SYMPTOMS
SORE THROAT: 1
TROUBLE SWALLOWING: 0

## 2023-08-16 ASSESSMENT — PAIN DESCRIPTION - LOCATION: LOCATION: THROAT

## 2023-08-17 NOTE — DISCHARGE INSTRUCTIONS
Concern for atypical source of infection- as we discussed. Strep negative, mono negative. Other results will take approximately 48 to return. Antibiotics as prescribed until gone. Continue antivirals at home. Return immediately for inability to swallow your own saliva, significant swelling, swelling greater on one side than the other, fevers that persist, or any other worsening symptoms or concerns.    Finish antibiotics

## 2023-08-17 NOTE — ED PROVIDER NOTES
[id number] [facility] ED  EMERGENCY DEPARTMENT ENCOUNTER      Pt Name: Yudith Mg  MRN: [id number]  [address] [facility] 2000  Date of evaluation: 8/16/2023  Provider: MEET Jones - CNP    CHIEF COMPLAINT       Chief Complaint   Patient presents with    Pharyngitis     Was here on Sunday, rapid strep was negative, returning for worsening pain, and increased swelling    Headache         HISTORY OF PRESENT ILLNESS   (Location/Symptom, Timing/Onset, Context/Setting, Quality, Duration, Modifying Factors, Severity)  Note limiting factors. Yudith Mg is a 24 y.o. male who presents to the emergency department for evaluation of a sore throat. Patient was here on 8/13, diagnosed with viral pharyngitis and sent home. He has been using Motrin and doing okay but he states that today his pain increased significantly. Fevers continue. He reports painful swallowing. He is tolerating his secretions. He denies any inability to swallow. He denies any trismus or inability to open the mouth. He does report some swelling in the neck no bleeding. The patient denies any nausea vomiting diarrhea. No ear pain no chest pain no cough no other viral symptoms    HPI    Nursing Notes were reviewed. REVIEW OF SYSTEMS    (2-9 systems for level 4, 10 or more for level 5)     Review of Systems   HENT:  Positive for sore throat. Negative for trouble swallowing. Except as noted above the remainder of the review of systems was reviewed and negative.        PAST MEDICAL HISTORY     Past Medical History:   Diagnosis Date    Blood in stool     Colitis     Diarrhea     H/O being hospitalized 05/2017    FOR SCHIZOPHRENIA  4 DAYS    H/O dizziness     RESOLVED    H/O migraine     RESOLVED    Nervous     Neurocardiogenic syncope     Paranoid ([address])     ABOUT PROCEDURE, HAS NEVER HAD SURGERY BEFORE    Scheduled immunizations not up to date     Schizophrenia ([address])     IN THERAPY FOR THIS         SURGICAL HISTORY       Past

## 2023-08-18 LAB
HSV1 DNA SPEC QL NAA+PROBE: POSITIVE
HSV2 DNA SPEC QL NAA+PROBE: NEGATIVE
MICROORGANISM/AGENT SPEC: NORMAL
SPECIMEN DESCRIPTION: ABNORMAL
SPECIMEN DESCRIPTION: NORMAL

## 2023-08-20 LAB
MICROORGANISM SPEC CULT: NORMAL
SPECIMEN DESCRIPTION: NORMAL

## 2024-01-18 ENCOUNTER — OFFICE VISIT (OUTPATIENT)
Dept: FAMILY MEDICINE CLINIC | Age: 24
End: 2024-01-18
Payer: MEDICAID

## 2024-01-18 VITALS
DIASTOLIC BLOOD PRESSURE: 74 MMHG | WEIGHT: 163 LBS | HEART RATE: 81 BPM | OXYGEN SATURATION: 98 % | HEIGHT: 69 IN | BODY MASS INDEX: 24.14 KG/M2 | SYSTOLIC BLOOD PRESSURE: 132 MMHG

## 2024-01-18 DIAGNOSIS — Z76.89 ESTABLISHING CARE WITH NEW DOCTOR, ENCOUNTER FOR: Primary | ICD-10-CM

## 2024-01-18 DIAGNOSIS — Z00.00 WELL ADULT EXAM: ICD-10-CM

## 2024-01-18 DIAGNOSIS — R10.12 LUQ ABDOMINAL PAIN: ICD-10-CM

## 2024-01-18 PROCEDURE — 99204 OFFICE O/P NEW MOD 45 MIN: CPT | Performed by: FAMILY MEDICINE

## 2024-01-18 RX ORDER — VALACYCLOVIR HYDROCHLORIDE 500 MG/1
500 TABLET, FILM COATED ORAL 2 TIMES DAILY
COMMUNITY

## 2024-01-18 SDOH — ECONOMIC STABILITY: FOOD INSECURITY: WITHIN THE PAST 12 MONTHS, THE FOOD YOU BOUGHT JUST DIDN'T LAST AND YOU DIDN'T HAVE MONEY TO GET MORE.: NEVER TRUE

## 2024-01-18 SDOH — ECONOMIC STABILITY: INCOME INSECURITY: HOW HARD IS IT FOR YOU TO PAY FOR THE VERY BASICS LIKE FOOD, HOUSING, MEDICAL CARE, AND HEATING?: NOT HARD AT ALL

## 2024-01-18 SDOH — ECONOMIC STABILITY: FOOD INSECURITY: WITHIN THE PAST 12 MONTHS, YOU WORRIED THAT YOUR FOOD WOULD RUN OUT BEFORE YOU GOT MONEY TO BUY MORE.: NEVER TRUE

## 2024-01-18 SDOH — ECONOMIC STABILITY: HOUSING INSECURITY
IN THE LAST 12 MONTHS, WAS THERE A TIME WHEN YOU DID NOT HAVE A STEADY PLACE TO SLEEP OR SLEPT IN A SHELTER (INCLUDING NOW)?: NO

## 2024-01-18 ASSESSMENT — PATIENT HEALTH QUESTIONNAIRE - PHQ9
SUM OF ALL RESPONSES TO PHQ QUESTIONS 1-9: 2
SUM OF ALL RESPONSES TO PHQ QUESTIONS 1-9: 2
1. LITTLE INTEREST OR PLEASURE IN DOING THINGS: 0
SUM OF ALL RESPONSES TO PHQ QUESTIONS 1-9: 2
SUM OF ALL RESPONSES TO PHQ9 QUESTIONS 1 & 2: 2
2. FEELING DOWN, DEPRESSED OR HOPELESS: 2
SUM OF ALL RESPONSES TO PHQ QUESTIONS 1-9: 2

## 2024-01-20 ENCOUNTER — HOSPITAL ENCOUNTER (OUTPATIENT)
Facility: CLINIC | Age: 24
End: 2024-01-20
Payer: MEDICAID

## 2024-01-20 ENCOUNTER — HOSPITAL ENCOUNTER (OUTPATIENT)
Dept: GENERAL RADIOLOGY | Facility: CLINIC | Age: 24
End: 2024-01-20
Payer: MEDICAID

## 2024-01-20 ENCOUNTER — HOSPITAL ENCOUNTER (OUTPATIENT)
Facility: CLINIC | Age: 24
Discharge: HOME | End: 2024-01-20
Payer: MEDICAID

## 2024-01-20 DIAGNOSIS — R10.12 LUQ ABDOMINAL PAIN: ICD-10-CM

## 2024-01-20 DIAGNOSIS — Z00.00 WELL ADULT EXAM: ICD-10-CM

## 2024-01-20 LAB
25(OH)D3 SERPL-MCNC: 13.5 NG/ML
ALBUMIN SERPL-MCNC: 4.8 G/DL (ref 3.5–5.2)
ALBUMIN/GLOB SERPL: 1.7 {RATIO} (ref 1–2.5)
ALP SERPL-CCNC: 101 U/L (ref 40–129)
ALT SERPL-CCNC: 19 U/L (ref 5–41)
ANION GAP SERPL CALCULATED.3IONS-SCNC: 13 MMOL/L (ref 9–17)
AST SERPL-CCNC: 20 U/L
BASOPHILS # BLD: 0.04 K/UL (ref 0–0.2)
BASOPHILS NFR BLD: 1 % (ref 0–2)
BILIRUB SERPL-MCNC: 0.6 MG/DL (ref 0.3–1.2)
BUN SERPL-MCNC: 13 MG/DL (ref 6–20)
CALCIUM SERPL-MCNC: 9.7 MG/DL (ref 8.6–10.4)
CHLORIDE SERPL-SCNC: 103 MMOL/L (ref 98–107)
CHOLEST SERPL-MCNC: 201 MG/DL
CHOLESTEROL/HDL RATIO: 4.8
CO2 SERPL-SCNC: 25 MMOL/L (ref 20–31)
CREAT SERPL-MCNC: 0.9 MG/DL (ref 0.7–1.2)
EOSINOPHIL # BLD: 0.07 K/UL (ref 0–0.44)
EOSINOPHILS RELATIVE PERCENT: 1 % (ref 1–4)
ERYTHROCYTE [DISTWIDTH] IN BLOOD BY AUTOMATED COUNT: 12 % (ref 11.8–14.4)
FOLATE SERPL-MCNC: 17 NG/ML
GFR SERPL CREATININE-BSD FRML MDRD: >60 ML/MIN/1.73M2
GLUCOSE SERPL-MCNC: 92 MG/DL (ref 70–99)
HCT VFR BLD AUTO: 47.3 % (ref 40.7–50.3)
HDLC SERPL-MCNC: 42 MG/DL
HGB BLD-MCNC: 16.4 G/DL (ref 13–17)
IMM GRANULOCYTES # BLD AUTO: <0.03 K/UL (ref 0–0.3)
IMM GRANULOCYTES NFR BLD: 0 %
IRON SATN MFR SERPL: 50 % (ref 20–55)
IRON SERPL-MCNC: 163 UG/DL (ref 59–158)
LDLC SERPL CALC-MCNC: 132 MG/DL (ref 0–130)
LYMPHOCYTES NFR BLD: 3.61 K/UL (ref 1.1–3.7)
LYMPHOCYTES RELATIVE PERCENT: 51 % (ref 24–43)
MCH RBC QN AUTO: 30.4 PG (ref 25.2–33.5)
MCHC RBC AUTO-ENTMCNC: 34.7 G/DL (ref 28.4–34.8)
MCV RBC AUTO: 87.8 FL (ref 82.6–102.9)
MONOCYTES NFR BLD: 0.6 K/UL (ref 0.1–1.2)
MONOCYTES NFR BLD: 9 % (ref 3–12)
NEUTROPHILS NFR BLD: 38 % (ref 36–65)
NEUTS SEG NFR BLD: 2.68 K/UL (ref 1.5–8.1)
NRBC BLD-RTO: 0 PER 100 WBC
PLATELET # BLD AUTO: 258 K/UL (ref 138–453)
PMV BLD AUTO: 10.2 FL (ref 8.1–13.5)
POTASSIUM SERPL-SCNC: 4.3 MMOL/L (ref 3.7–5.3)
PROT SERPL-MCNC: 7.7 G/DL (ref 6.4–8.3)
RBC # BLD AUTO: 5.39 M/UL (ref 4.21–5.77)
SODIUM SERPL-SCNC: 141 MMOL/L (ref 135–144)
TIBC SERPL-MCNC: 325 UG/DL (ref 250–450)
TRIGL SERPL-MCNC: 137 MG/DL
UNSATURATED IRON BINDING CAPACITY: 162 UG/DL (ref 112–347)
VIT B12 SERPL-MCNC: 494 PG/ML (ref 232–1245)
WBC OTHER # BLD: 7 K/UL (ref 3.5–11.3)

## 2024-01-20 PROCEDURE — 74019 RADEX ABDOMEN 2 VIEWS: CPT

## 2024-01-20 PROCEDURE — 82306 VITAMIN D 25 HYDROXY: CPT

## 2024-01-20 PROCEDURE — 85025 COMPLETE CBC W/AUTO DIFF WBC: CPT

## 2024-01-20 PROCEDURE — 84439 ASSAY OF FREE THYROXINE: CPT

## 2024-01-20 PROCEDURE — 83550 IRON BINDING TEST: CPT

## 2024-01-20 PROCEDURE — 83540 ASSAY OF IRON: CPT

## 2024-01-20 PROCEDURE — 82607 VITAMIN B-12: CPT

## 2024-01-20 PROCEDURE — 80053 COMPREHEN METABOLIC PANEL: CPT

## 2024-01-20 PROCEDURE — 82746 ASSAY OF FOLIC ACID SERUM: CPT

## 2024-01-20 PROCEDURE — 36415 COLL VENOUS BLD VENIPUNCTURE: CPT

## 2024-01-20 PROCEDURE — 84443 ASSAY THYROID STIM HORMONE: CPT

## 2024-01-20 PROCEDURE — 80061 LIPID PANEL: CPT

## 2024-01-21 LAB
T4 FREE SERPL-MCNC: 1.5 NG/DL (ref 0.9–1.7)
TSH SERPL DL<=0.05 MIU/L-ACNC: 1.38 UIU/ML (ref 0.3–5)

## 2024-01-21 RX ORDER — CHOLECALCIFEROL (VITAMIN D3) 125 MCG
5000 CAPSULE ORAL DAILY
Qty: 30 CAPSULE | Refills: 11 | Status: SHIPPED | OUTPATIENT
Start: 2024-01-21 | End: 2024-02-20

## 2024-01-31 NOTE — PROGRESS NOTES
tablet Take 1 tablet by mouth every 6 hours as needed for Pain (Patient not taking: Reported on 8/7/2019) 120 tablet 0     No current facility-administered medications for this visit.     ALLERGIES:    Allergies   Allergen Reactions    Pcn [Penicillins] Hives       Social History     Tobacco Use    Smoking status: Every Day     Current packs/day: 0.25     Average packs/day: 0.3 packs/day for 6.7 years (1.7 ttl pk-yrs)     Types: Cigarettes     Start date: 05/2017    Smokeless tobacco: Never   Substance Use Topics    Alcohol use: No        LDL Cholesterol (mg/dL)   Date Value   01/20/2024 132 (H)     HDL (mg/dL)   Date Value   01/20/2024 42     BUN (mg/dL)   Date Value   01/20/2024 13     Creatinine (mg/dL)   Date Value   01/20/2024 0.9     Glucose (mg/dL)   Date Value   01/20/2024 92     Hemoglobin A1C (%)   Date Value   02/08/2018 5.2              Subjective:      HPI  He is being seen today as a new patient to establish care he needed a new family doctor but he also states he is not having ongoing left-sided rib discomfort on the anterior aspect of his abdomen ongoing for long time now almost a year he states there is no specific rhyme or reason to it is not exactly necessarily with palpation is just as there is not more when he moves or when he moves is not associated with eating or not eating and that she is can there all the time and he wanted to get it evaluated    Review of Systems:     Constitutional: Negative for fever, appetite change and fatigue.        Family social and medical history reviewed and unchanged     HENT: Negative.  Negative for nosebleeds, trouble swallowing and neck pain.    Eyes: Negative for photophobia and visual disturbance.   Respiratory: Negative.  Negative for chest tightness and shortness of breath.    Cardiovascular: Negative.  Negative for chest pain and leg swelling.   Gastrointestinal: Negative.  Negative for abdominal pain and blood in stool.   Endocrine: Negative for cold

## 2025-03-16 ENCOUNTER — HOSPITAL ENCOUNTER (EMERGENCY)
Facility: CLINIC | Age: 25
Discharge: HOME | End: 2025-03-16
Attending: STUDENT IN AN ORGANIZED HEALTH CARE EDUCATION/TRAINING PROGRAM
Payer: MEDICAID

## 2025-03-16 VITALS
OXYGEN SATURATION: 100 % | HEIGHT: 69 IN | RESPIRATION RATE: 17 BRPM | HEART RATE: 89 BPM | TEMPERATURE: 98.5 F | SYSTOLIC BLOOD PRESSURE: 123 MMHG | DIASTOLIC BLOOD PRESSURE: 74 MMHG | WEIGHT: 150 LBS | BODY MASS INDEX: 22.22 KG/M2

## 2025-03-16 DIAGNOSIS — R11.2 NAUSEA VOMITING AND DIARRHEA: Primary | ICD-10-CM

## 2025-03-16 DIAGNOSIS — R19.7 NAUSEA VOMITING AND DIARRHEA: Primary | ICD-10-CM

## 2025-03-16 LAB
ALBUMIN SERPL-MCNC: 4.7 G/DL (ref 3.5–5.2)
ALBUMIN/GLOB SERPL: 1.6 {RATIO}
ALP SERPL-CCNC: 104 U/L (ref 40–129)
ALT SERPL-CCNC: 22 U/L (ref 10–50)
ANION GAP SERPL CALCULATED.3IONS-SCNC: 12 MMOL/L (ref 9–16)
AST SERPL-CCNC: 25 U/L (ref 10–50)
BASOPHILS # BLD: 0 K/UL (ref 0–0.2)
BASOPHILS NFR BLD: 0 % (ref 0–2)
BILIRUB SERPL-MCNC: 0.3 MG/DL (ref 0–1.2)
BUN SERPL-MCNC: 15 MG/DL (ref 6–20)
CALCIUM SERPL-MCNC: 9.8 MG/DL (ref 8.6–10.4)
CHLORIDE SERPL-SCNC: 104 MMOL/L (ref 98–107)
CO2 SERPL-SCNC: 25 MMOL/L (ref 20–31)
CREAT SERPL-MCNC: 0.9 MG/DL (ref 0.7–1.2)
EOSINOPHIL # BLD: 0.1 K/UL (ref 0–0.4)
EOSINOPHILS RELATIVE PERCENT: 1 % (ref 1–4)
ERYTHROCYTE [DISTWIDTH] IN BLOOD BY AUTOMATED COUNT: 13.2 % (ref 12.5–15.4)
GFR, ESTIMATED: >90 ML/MIN/1.73M2
GLUCOSE BLD-MCNC: 112 MG/DL (ref 75–110)
GLUCOSE SERPL-MCNC: 111 MG/DL (ref 74–99)
HCT VFR BLD AUTO: 46.2 % (ref 41–53)
HGB BLD-MCNC: 16.1 G/DL (ref 13.5–17.5)
LIPASE SERPL-CCNC: 23 U/L (ref 13–60)
LYMPHOCYTES NFR BLD: 3.9 K/UL (ref 1–4.8)
LYMPHOCYTES RELATIVE PERCENT: 49 % (ref 24–44)
MCH RBC QN AUTO: 31.4 PG (ref 26–34)
MCHC RBC AUTO-ENTMCNC: 34.9 G/DL (ref 31–37)
MCV RBC AUTO: 90.1 FL (ref 80–100)
MONOCYTES NFR BLD: 0.8 K/UL (ref 0.1–1.2)
MONOCYTES NFR BLD: 9 % (ref 2–11)
NEUTROPHILS NFR BLD: 41 % (ref 36–66)
NEUTS SEG NFR BLD: 3.3 K/UL (ref 1.8–7.7)
PLATELET # BLD AUTO: 252 K/UL (ref 140–450)
PMV BLD AUTO: 7.7 FL (ref 6–12)
POTASSIUM SERPL-SCNC: 4 MMOL/L (ref 3.7–5.3)
PROT SERPL-MCNC: 7.7 G/DL (ref 6.6–8.7)
RBC # BLD AUTO: 5.13 M/UL (ref 4.5–5.9)
SODIUM SERPL-SCNC: 141 MMOL/L (ref 136–145)
WBC OTHER # BLD: 8.1 K/UL (ref 3.5–11)

## 2025-03-16 PROCEDURE — 96375 TX/PRO/DX INJ NEW DRUG ADDON: CPT

## 2025-03-16 PROCEDURE — 96374 THER/PROPH/DIAG INJ IV PUSH: CPT

## 2025-03-16 PROCEDURE — 6360000002 HC RX W HCPCS: Performed by: STUDENT IN AN ORGANIZED HEALTH CARE EDUCATION/TRAINING PROGRAM

## 2025-03-16 PROCEDURE — 85025 COMPLETE CBC W/AUTO DIFF WBC: CPT

## 2025-03-16 PROCEDURE — 99284 EMERGENCY DEPT VISIT MOD MDM: CPT

## 2025-03-16 PROCEDURE — 2580000003 HC RX 258: Performed by: STUDENT IN AN ORGANIZED HEALTH CARE EDUCATION/TRAINING PROGRAM

## 2025-03-16 PROCEDURE — 82947 ASSAY GLUCOSE BLOOD QUANT: CPT

## 2025-03-16 PROCEDURE — 2500000003 HC RX 250 WO HCPCS: Performed by: STUDENT IN AN ORGANIZED HEALTH CARE EDUCATION/TRAINING PROGRAM

## 2025-03-16 PROCEDURE — 83690 ASSAY OF LIPASE: CPT

## 2025-03-16 PROCEDURE — 80053 COMPREHEN METABOLIC PANEL: CPT

## 2025-03-16 RX ORDER — 0.9 % SODIUM CHLORIDE 0.9 %
1000 INTRAVENOUS SOLUTION INTRAVENOUS ONCE
Status: COMPLETED | OUTPATIENT
Start: 2025-03-16 | End: 2025-03-16

## 2025-03-16 RX ORDER — ONDANSETRON 4 MG/1
4 TABLET, ORALLY DISINTEGRATING ORAL 3 TIMES DAILY PRN
Qty: 21 TABLET | Refills: 0 | Status: SHIPPED | OUTPATIENT
Start: 2025-03-16

## 2025-03-16 RX ORDER — ONDANSETRON 2 MG/ML
4 INJECTION INTRAMUSCULAR; INTRAVENOUS ONCE
Status: COMPLETED | OUTPATIENT
Start: 2025-03-16 | End: 2025-03-16

## 2025-03-16 RX ADMIN — ONDANSETRON 4 MG: 2 INJECTION, SOLUTION INTRAMUSCULAR; INTRAVENOUS at 13:50

## 2025-03-16 RX ADMIN — SODIUM CHLORIDE 1000 ML: 9 INJECTION, SOLUTION INTRAVENOUS at 14:00

## 2025-03-16 RX ADMIN — FAMOTIDINE 20 MG: 10 INJECTION, SOLUTION INTRAVENOUS at 13:46

## 2025-03-16 ASSESSMENT — LIFESTYLE VARIABLES
HOW MANY STANDARD DRINKS CONTAINING ALCOHOL DO YOU HAVE ON A TYPICAL DAY: 1 OR 2
HOW OFTEN DO YOU HAVE A DRINK CONTAINING ALCOHOL: 2-4 TIMES A MONTH

## 2025-03-16 NOTE — ED PROVIDER NOTES
[facility]  [address] [address]  Phone: [phone]      Patient Name:  Avelino Mendosa  Medical Record Number:  [id number]  YOB: 2000  Date of Service:  3/16/2025  Primary Care Physician:  No primary care provider on file.      CHIEF COMPLAINT:       Chief Complaint   Patient presents with    Diarrhea    Chills       HISTORY OF PRESENT ILLNESS:    Avelino Mendosa is a 24 y.o. male who presents with the complaint of diarrhea, chills and feeling groggy and slow this morning.  Patient states that he has had diarrhea since this morning, denies any dark or tarry stools.  Denies any bloody stools.  Has had some nausea, no vomiting.  States he feels dehydrated.  He also states that he had 7 mixed drinks yesterday evening, has not taken any medication for symptoms.  Also reports decreased fluid intake over the last week.  Denies any chest pain, difficulty breathing, abdominal pain.    CURRENT MEDICATIONS:      Discharge Medication List as of 3/16/2025  2:29 PM        CONTINUE these medications which have NOT CHANGED    Details   vitamin D (VITAMIN D3 ULTRA STRENGTH) 125 MCG (5000 UT) CAPS capsule Take 1 capsule by mouth daily, Disp-30 capsule, R-11Normal      valACYclovir (VALTREX) 500 MG tablet Take 1 tablet by mouth 2 times dailyHistorical Med      ibuprofen (IBU) 600 MG tablet Take 1 tablet by mouth every 6 hours as needed for Pain, Disp-120 tablet, R-0Print             ALLERGIES:   is allergic to pcn [penicillins].    PAST MEDICAL HISTORY:    has a past medical history of Blood in stool, Colitis, Diarrhea, H/O being hospitalized, H/O dizziness, H/O migraine, Nervous, Neurocardiogenic syncope, Paranoid (HCC), Scheduled immunizations not up to date, and Schizophrenia (HCC).    SURGICAL HISTORY:      has a past surgical history that includes Upper gastrointestinal endoscopy (08/31/2017); Colonoscopy (08/31/2017); pr esophagogastroduodenoscopy transoral diagnostic

## 2025-06-04 ENCOUNTER — OFFICE VISIT (OUTPATIENT)
Age: 25
End: 2025-06-04

## 2025-06-04 VITALS
TEMPERATURE: 98.1 F | SYSTOLIC BLOOD PRESSURE: 115 MMHG | HEART RATE: 87 BPM | BODY MASS INDEX: 23.7 KG/M2 | WEIGHT: 160 LBS | RESPIRATION RATE: 18 BRPM | DIASTOLIC BLOOD PRESSURE: 72 MMHG | OXYGEN SATURATION: 96 % | HEIGHT: 69 IN

## 2025-06-04 DIAGNOSIS — H66.92 LEFT OTITIS MEDIA, UNSPECIFIED OTITIS MEDIA TYPE: Primary | ICD-10-CM

## 2025-06-04 DIAGNOSIS — J01.11 ACUTE RECURRENT FRONTAL SINUSITIS: ICD-10-CM

## 2025-06-04 RX ORDER — AZITHROMYCIN 250 MG/1
TABLET, FILM COATED ORAL
Qty: 6 TABLET | Refills: 0 | Status: SHIPPED | OUTPATIENT
Start: 2025-06-04 | End: 2025-06-14

## 2025-06-04 ASSESSMENT — ENCOUNTER SYMPTOMS
SINUS COMPLAINT: 1
GASTROINTESTINAL NEGATIVE: 1
SINUS PAIN: 1
VOICE CHANGE: 1
EYES NEGATIVE: 1
SORE THROAT: 1
COUGH: 1
SINUS PRESSURE: 1

## 2025-06-04 NOTE — PROGRESS NOTES
Chief complaint(s): Pharyngitis (Pt has been sick for 3 weeks. In the last week he has had sinus pressure. ), Congestion, Sinus Problem (When pt's blow his nose, he has yellow mucus and when he clears his throat, he can feel the mucus. ), and Ear Fullness (Pt's ears have felt muffled.)    Avelino Mendosa (: 2000) is a 24 y.o. male, Established patient, here for evaluation of the following   Pharyngitis  Associated symptoms: congestion, cough, ear pain, fatigue, headaches and sore throat    Sinus Problem  Associated symptoms include congestion, coughing, ear pain, headaches, sinus pressure and a sore throat.   Ear Fullness   Associated symptoms include coughing, headaches and a sore throat.   Ear Pain   Episode onset: 2 days. The problem has been gradually worsening. The pain is at a severity of 8/10. Associated symptoms include coughing, headaches and a sore throat. He has tried NSAIDs for the symptoms. The treatment provided mild relief.       PAST MEDICAL HISTORY    Past Medical History:   Diagnosis Date    Blood in stool     Colitis     Diarrhea     H/O being hospitalized 2017    FOR SCHIZOPHRENIA  4 DAYS    H/O dizziness     RESOLVED    H/O migraine     RESOLVED    Nervous     Neurocardiogenic syncope     Paranoid (HCC)     ABOUT PROCEDURE, HAS NEVER HAD SURGERY BEFORE    Scheduled immunizations not up to date     Schizophrenia (HCC)     IN THERAPY FOR THIS       SURGICAL HISTORY    Past Surgical History:   Procedure Laterality Date    COLONOSCOPY  2017    IA COLONOSCOPY W/BIOPSY SINGLE/MULTIPLE N/A 2017    COLONOSCOPY WITH BIOPSY performed by Arjun Bee MD at [facility] OR    IA ESOPHAGOGASTRODUODENOSCOPY TRANSORAL DIAGNOSTIC N/A 2017    EGD ESOPHAGOGASTRODUODENOSCOPY- GI UNIT SCHEDULED performed by Arjun Bee MD at [facility] OR    UPPER GASTROINTESTINAL ENDOSCOPY  2017       CURRENT MEDICATIONS    Current Outpatient Rx   Medication Sig Dispense Refill    azithromycin

## 2025-07-22 ENCOUNTER — HOSPITAL ENCOUNTER (EMERGENCY)
Facility: CLINIC | Age: 25
Discharge: HOME | End: 2025-07-22
Attending: STUDENT IN AN ORGANIZED HEALTH CARE EDUCATION/TRAINING PROGRAM
Payer: MEDICAID

## 2025-07-22 VITALS
TEMPERATURE: 97.7 F | SYSTOLIC BLOOD PRESSURE: 113 MMHG | WEIGHT: 160 LBS | HEART RATE: 53 BPM | RESPIRATION RATE: 16 BRPM | OXYGEN SATURATION: 99 % | HEIGHT: 69 IN | BODY MASS INDEX: 23.7 KG/M2 | DIASTOLIC BLOOD PRESSURE: 59 MMHG

## 2025-07-22 DIAGNOSIS — R11.2 NAUSEA AND VOMITING, UNSPECIFIED VOMITING TYPE: Primary | ICD-10-CM

## 2025-07-22 LAB
ALBUMIN SERPL-MCNC: 4.6 G/DL (ref 3.5–5.2)
ALBUMIN/GLOB SERPL: 1.7 {RATIO}
ALP SERPL-CCNC: 85 U/L (ref 40–129)
ALT SERPL-CCNC: 18 U/L (ref 10–50)
ANION GAP SERPL CALCULATED.3IONS-SCNC: 11 MMOL/L (ref 9–16)
AST SERPL-CCNC: 22 U/L (ref 10–50)
ATYPICAL LYMPHOCYTE ABSOLUTE COUNT: 0.26 K/UL
ATYPICAL LYMPHOCYTES: 4 %
BASOPHILS # BLD: 0 K/UL (ref 0–0.2)
BASOPHILS NFR BLD: 0 % (ref 0–2)
BILIRUB DIRECT SERPL-MCNC: 0.2 MG/DL (ref 0–0.3)
BILIRUB INDIRECT SERPL-MCNC: 0.3 MG/DL (ref 0–1)
BILIRUB SERPL-MCNC: 0.5 MG/DL (ref 0–1.2)
BUN SERPL-MCNC: 15 MG/DL (ref 6–20)
CALCIUM SERPL-MCNC: 9.9 MG/DL (ref 8.6–10.4)
CHLORIDE SERPL-SCNC: 103 MMOL/L (ref 98–107)
CO2 SERPL-SCNC: 26 MMOL/L (ref 20–31)
CREAT SERPL-MCNC: 0.9 MG/DL (ref 0.7–1.2)
EOSINOPHIL # BLD: 0.26 K/UL (ref 0–0.4)
EOSINOPHILS RELATIVE PERCENT: 4 % (ref 1–4)
ERYTHROCYTE [DISTWIDTH] IN BLOOD BY AUTOMATED COUNT: 12.8 % (ref 12.5–15.4)
GFR, ESTIMATED: >90 ML/MIN/1.73M2
GLUCOSE SERPL-MCNC: 101 MG/DL (ref 74–99)
HCT VFR BLD AUTO: 42.6 % (ref 41–53)
HGB BLD-MCNC: 14.4 G/DL (ref 13.5–17.5)
LYMPHOCYTES NFR BLD: 2.44 K/UL (ref 1–4.8)
LYMPHOCYTES RELATIVE PERCENT: 37 % (ref 24–44)
MCH RBC QN AUTO: 30.4 PG (ref 26–34)
MCHC RBC AUTO-ENTMCNC: 33.8 G/DL (ref 31–37)
MCV RBC AUTO: 88.4 FL (ref 80–100)
MONOCYTES NFR BLD: 0.46 K/UL (ref 0.1–0.8)
MONOCYTES NFR BLD: 7 % (ref 1–7)
MORPHOLOGY: NORMAL
NEUTROPHILS NFR BLD: 48 % (ref 36–66)
NEUTS SEG NFR BLD: 3.18 K/UL (ref 1.8–7.7)
PLATELET # BLD AUTO: 238 K/UL (ref 140–450)
PMV BLD AUTO: 7.8 FL (ref 6–12)
POTASSIUM SERPL-SCNC: 4 MMOL/L (ref 3.7–5.3)
PROT SERPL-MCNC: 7.3 G/DL (ref 6.6–8.7)
RBC # BLD AUTO: 4.61 M/UL (ref 4.5–5.9)
SODIUM SERPL-SCNC: 140 MMOL/L (ref 136–145)
WBC OTHER # BLD: 6.6 K/UL (ref 3.5–11)

## 2025-07-22 PROCEDURE — 99284 EMERGENCY DEPT VISIT MOD MDM: CPT

## 2025-07-22 PROCEDURE — 80048 BASIC METABOLIC PNL TOTAL CA: CPT

## 2025-07-22 PROCEDURE — 85025 COMPLETE CBC W/AUTO DIFF WBC: CPT

## 2025-07-22 PROCEDURE — 96374 THER/PROPH/DIAG INJ IV PUSH: CPT

## 2025-07-22 PROCEDURE — 2580000003 HC RX 258: Performed by: PHYSICIAN ASSISTANT

## 2025-07-22 PROCEDURE — 36415 COLL VENOUS BLD VENIPUNCTURE: CPT

## 2025-07-22 PROCEDURE — 96361 HYDRATE IV INFUSION ADD-ON: CPT

## 2025-07-22 PROCEDURE — 6360000002 HC RX W HCPCS: Performed by: PHYSICIAN ASSISTANT

## 2025-07-22 PROCEDURE — 80076 HEPATIC FUNCTION PANEL: CPT

## 2025-07-22 RX ORDER — ONDANSETRON 4 MG/1
4 TABLET, FILM COATED ORAL EVERY 8 HOURS PRN
Qty: 14 TABLET | Refills: 0 | Status: SHIPPED | OUTPATIENT
Start: 2025-07-22

## 2025-07-22 RX ORDER — 0.9 % SODIUM CHLORIDE 0.9 %
1000 INTRAVENOUS SOLUTION INTRAVENOUS ONCE
Status: COMPLETED | OUTPATIENT
Start: 2025-07-22 | End: 2025-07-22

## 2025-07-22 RX ORDER — ONDANSETRON 2 MG/ML
4 INJECTION INTRAMUSCULAR; INTRAVENOUS ONCE
Status: COMPLETED | OUTPATIENT
Start: 2025-07-22 | End: 2025-07-22

## 2025-07-22 RX ADMIN — SODIUM CHLORIDE 1000 ML: 0.9 INJECTION, SOLUTION INTRAVENOUS at 13:46

## 2025-07-22 RX ADMIN — ONDANSETRON 4 MG: 2 INJECTION, SOLUTION INTRAMUSCULAR; INTRAVENOUS at 13:47

## 2025-07-22 ASSESSMENT — ENCOUNTER SYMPTOMS
SORE THROAT: 0
COUGH: 0
SHORTNESS OF BREATH: 0
ABDOMINAL PAIN: 0
EYE REDNESS: 0
VOMITING: 1
BACK PAIN: 0
DIARRHEA: 0
EYE DISCHARGE: 0
NAUSEA: 1

## 2025-07-22 ASSESSMENT — PAIN - FUNCTIONAL ASSESSMENT
PAIN_FUNCTIONAL_ASSESSMENT: NONE - DENIES PAIN
PAIN_FUNCTIONAL_ASSESSMENT: NONE - DENIES PAIN

## 2025-07-22 NOTE — ED PROVIDER NOTES
[facility]  [address] [facility] [address]  Phone: [phone]      Pt Name: Avelino Mendosa  MRN: [id number]  Birthdate 2000  Date of evaluation: 7/22/2025      CHIEF COMPLAINT       Chief Complaint   Patient presents with    Vomiting       HISTORY OF PRESENT ILLNESS   (Location, Quality, Severity, Duration, Timing, Context, ModifyingFactors, Associated Signs and Symptoms)     Avelino Mendosa is a 24 y.o. male who presents to the ED for evaluation of nausea and vomiting.  Patient states that he awoke this morning with nausea and vomiting.  He has had 3 episodes of vomiting and states that every time he tries to drink something the vomiting returns.  He states that his girlfriend awoke with similar symptoms, but is already feeling better.  Patient denies associated abdominal pain and diarrhea.  He does report a history of ulcerative colitis.  He states that he is not currently on any medication for his ulcerative colitis.  Patient continues to feel nauseous.  He reports decreased urine output.  Patient has had no previous abdominal surgeries.    Nursing Notes were reviewed.    REVIEW OF SYSTEMS     (2-9 systems for level 4, 10 or more for level 5)    Review of Systems   Constitutional:  Negative for chills and fever.   HENT:  Negative for congestion, ear pain and sore throat.    Eyes:  Negative for discharge and redness.   Respiratory:  Negative for cough and shortness of breath.    Cardiovascular:  Negative for chest pain.   Gastrointestinal:  Positive for nausea and vomiting. Negative for abdominal pain and diarrhea.   Genitourinary:  Positive for decreased urine volume. Negative for dysuria and frequency.   Musculoskeletal:  Negative for back pain and neck pain.   Skin:  Negative for rash.   Neurological:  Negative for seizures, syncope and headaches.     PAST MEDICAL HISTORY    has a past medical history of Blood in stool, Colitis, Diarrhea, H/O being hospitalized, H/O

## 2025-07-22 NOTE — ED PROVIDER NOTES
[facility]  EMERGENCY DEPARTMENT ENCOUNTER      Attending Physician Attestation    I performed a history and physical examination of the patient and discussed management with the mid level provider. I reviewed the mid level provider's note and agree with the documented findings and plan of care. Any areas of disagreement are noted on the chart. I was personally present for the key portions of any procedures. I have documented in the chart those procedures where I was not present during the key portions. I have reviewed the emergency nurses triage note. I agree with the chief complaint, past medical history, past surgical history, allergies, medications, social and family history as documented unless otherwise noted below. Documentation of the HPI, Physical Exam and Medical Decision Making performed by mid level providers is based on my personal performance of the HPI, PE and MDM. For Physician Assistant/ Nurse Practitioner cases/documentation I have personally evaluated this patient and have completed at least one if not all key elements of the E/M (history, physical exam, and MDM). Additional findings are as noted.      Chief Complaint   Patient presents with    Vomiting       INITIAL VITALS:  height is 1.753 m (5' 9\") and weight is 72.6 kg (160 lb). His temperature is 97.7 °F (36.5 °C). His blood pressure is 113/59 (abnormal) and his pulse is 53. His respiration is 16 and oxygen saturation is 99%.       DIAGNOSTIC RESULTS       RADIOLOGY:   Non-plain film images such as CT, Ultrasound and MRI are read by the radiologist. Plain radiographic images are visualized and the radiologist interpretations are reviewed as follows:     No orders to display         LABS:  Results for orders placed or performed during the hospital encounter of 07/22/25   CBC with Auto Differential   Result Value Ref Range    WBC 6.6 3.5 - 11.0 k/uL    RBC 4.61 4.5 - 5.9 m/uL    Hemoglobin 14.4 13.5 - 17.5 g/dL

## 2025-07-22 NOTE — ED TRIAGE NOTES
Pt reports he is \"hung over\" and dehydrated. Reports having 8 drinks last night. Also reports h/o POTS

## 2025-07-22 NOTE — DISCHARGE INSTRUCTIONS
Please read and follow all instructions.  Take Zofran as directed for nausea and vomiting.  Adhere to a liquid diet over the next 24 hours, if you have no additional vomiting you may advance your diet as tolerated.  Follow-up evaluation with primary care doctor in the next 2 to 3 days.  Return to the ER for development of abdominal pain, persistent vomiting, development of fever above 101 °F, or any other concerning symptoms.

## 2025-08-17 ENCOUNTER — HOSPITAL ENCOUNTER (EMERGENCY)
Facility: CLINIC | Age: 25
Discharge: HOME | End: 2025-08-17
Attending: EMERGENCY MEDICINE
Payer: MEDICAID

## 2025-08-17 VITALS
TEMPERATURE: 97.8 F | OXYGEN SATURATION: 98 % | DIASTOLIC BLOOD PRESSURE: 71 MMHG | SYSTOLIC BLOOD PRESSURE: 134 MMHG | WEIGHT: 160 LBS | BODY MASS INDEX: 23.63 KG/M2 | HEART RATE: 67 BPM | RESPIRATION RATE: 17 BRPM

## 2025-08-17 DIAGNOSIS — T14.8XXA MUSCLE STRAIN: Primary | ICD-10-CM

## 2025-08-17 DIAGNOSIS — F41.1 ANXIETY STATE: ICD-10-CM

## 2025-08-17 PROCEDURE — 6370000000 HC RX 637 (ALT 250 FOR IP): Performed by: EMERGENCY MEDICINE

## 2025-08-17 PROCEDURE — 99283 EMERGENCY DEPT VISIT LOW MDM: CPT

## 2025-08-17 PROCEDURE — 6360000002 HC RX W HCPCS: Performed by: EMERGENCY MEDICINE

## 2025-08-17 RX ORDER — DEXAMETHASONE 4 MG/1
8 TABLET ORAL ONCE
Status: COMPLETED | OUTPATIENT
Start: 2025-08-17 | End: 2025-08-17

## 2025-08-17 RX ORDER — MECLIZINE HCL 12.5 MG 12.5 MG/1
25 TABLET ORAL ONCE
Status: COMPLETED | OUTPATIENT
Start: 2025-08-17 | End: 2025-08-17

## 2025-08-17 RX ADMIN — DEXAMETHASONE 8 MG: 4 TABLET ORAL at 07:50

## 2025-08-17 RX ADMIN — MECLIZINE 25 MG: 12.5 TABLET ORAL at 07:50

## 2025-08-17 ASSESSMENT — PAIN SCALES - GENERAL: PAINLEVEL_OUTOF10: 0

## 2025-08-20 ENCOUNTER — OFFICE VISIT (OUTPATIENT)
Dept: FAMILY MEDICINE CLINIC | Age: 25
End: 2025-08-20
Payer: MEDICAID

## 2025-08-20 VITALS
TEMPERATURE: 97.7 F | OXYGEN SATURATION: 97 % | HEIGHT: 69 IN | DIASTOLIC BLOOD PRESSURE: 72 MMHG | SYSTOLIC BLOOD PRESSURE: 126 MMHG | HEART RATE: 80 BPM | BODY MASS INDEX: 24.73 KG/M2 | WEIGHT: 167 LBS

## 2025-08-20 DIAGNOSIS — E55.9 VITAMIN D DEFICIENCY: ICD-10-CM

## 2025-08-20 DIAGNOSIS — R20.2 NUMBNESS AND TINGLING: ICD-10-CM

## 2025-08-20 DIAGNOSIS — Z11.59 NEED FOR HEPATITIS C SCREENING TEST: ICD-10-CM

## 2025-08-20 DIAGNOSIS — F41.9 ANXIETY: ICD-10-CM

## 2025-08-20 DIAGNOSIS — Z76.89 ENCOUNTER TO ESTABLISH CARE WITH NEW PROVIDER: ICD-10-CM

## 2025-08-20 DIAGNOSIS — Z13.220 SCREENING, LIPID: ICD-10-CM

## 2025-08-20 DIAGNOSIS — R20.0 NUMBNESS AND TINGLING: ICD-10-CM

## 2025-08-20 DIAGNOSIS — M54.2 NECK PAIN: Primary | ICD-10-CM

## 2025-08-20 DIAGNOSIS — Z13.1 DIABETES MELLITUS SCREENING: ICD-10-CM

## 2025-08-20 LAB
ALBUMIN: 5 G/DL
ALP BLD-CCNC: 69 U/L
ALT SERPL-CCNC: 23 U/L
ANTIBODY: NON REACTIVE
AST SERPL-CCNC: 18 U/L
BILIRUB SERPL-MCNC: 0.6 MG/DL (ref 0.1–1.4)
BUN BLDV-MCNC: 13 MG/DL
CALCIUM SERPL-MCNC: 10.2 MG/DL
CHLORIDE BLD-SCNC: 102 MMOL/L
CHOLESTEROL, TOTAL: 231 MG/DL
CHOLESTEROL/HDL RATIO: 52
CO2: 29 MMOL/L
CREAT SERPL-MCNC: 0.98 MG/DL
GFR, ESTIMATED: >90
GLUCOSE FASTING: 107 MG/DL
HDLC SERPL-MCNC: 52 MG/DL (ref 35–70)
LDL CHOLESTEROL: 139
NONHDLC SERPL-MCNC: NORMAL MG/DL
POTASSIUM SERPL-SCNC: 3.9 MMOL/L
SODIUM BLD-SCNC: 141 MMOL/L
TOTAL PROTEIN: 7.8 G/DL (ref 6.4–8.2)
TRIGL SERPL-MCNC: 199 MG/DL
VLDLC SERPL CALC-MCNC: NORMAL MG/DL

## 2025-08-20 PROCEDURE — 99215 OFFICE O/P EST HI 40 MIN: CPT

## 2025-08-20 RX ORDER — PREDNISONE 20 MG/1
20 TABLET ORAL
COMMUNITY
Start: 2025-08-16

## 2025-08-20 RX ORDER — CYCLOBENZAPRINE HCL 5 MG
5 TABLET ORAL NIGHTLY PRN
Qty: 10 TABLET | Refills: 0 | Status: SHIPPED | OUTPATIENT
Start: 2025-08-20 | End: 2025-08-30

## 2025-08-20 RX ORDER — MECLIZINE HYDROCHLORIDE 25 MG/1
25 TABLET ORAL
COMMUNITY
Start: 2025-08-16

## 2025-08-20 SDOH — ECONOMIC STABILITY: FOOD INSECURITY: WITHIN THE PAST 12 MONTHS, THE FOOD YOU BOUGHT JUST DIDN'T LAST AND YOU DIDN'T HAVE MONEY TO GET MORE.: NEVER TRUE

## 2025-08-20 SDOH — ECONOMIC STABILITY: FOOD INSECURITY: WITHIN THE PAST 12 MONTHS, YOU WORRIED THAT YOUR FOOD WOULD RUN OUT BEFORE YOU GOT MONEY TO BUY MORE.: NEVER TRUE

## 2025-08-20 SDOH — HEALTH STABILITY: PHYSICAL HEALTH: ON AVERAGE, HOW MANY DAYS PER WEEK DO YOU ENGAGE IN MODERATE TO STRENUOUS EXERCISE (LIKE A BRISK WALK)?: 2 DAYS

## 2025-08-20 SDOH — HEALTH STABILITY: PHYSICAL HEALTH: ON AVERAGE, HOW MANY MINUTES DO YOU ENGAGE IN EXERCISE AT THIS LEVEL?: 70 MIN

## 2025-08-20 ASSESSMENT — ENCOUNTER SYMPTOMS
COUGH: 0
SINUS PRESSURE: 1
SHORTNESS OF BREATH: 0
ABDOMINAL PAIN: 0
EYE PAIN: 0
RHINORRHEA: 0
SINUS PAIN: 0

## 2025-08-20 ASSESSMENT — PATIENT HEALTH QUESTIONNAIRE - PHQ9
SUM OF ALL RESPONSES TO PHQ QUESTIONS 1-9: 2
2. FEELING DOWN, DEPRESSED OR HOPELESS: SEVERAL DAYS
1. LITTLE INTEREST OR PLEASURE IN DOING THINGS: SEVERAL DAYS
SUM OF ALL RESPONSES TO PHQ QUESTIONS 1-9: 2

## 2025-08-21 ENCOUNTER — HOSPITAL ENCOUNTER (OUTPATIENT)
Dept: CT IMAGING | Facility: CLINIC | Age: 25
Discharge: HOME | End: 2025-08-23
Payer: MEDICAID

## 2025-08-21 DIAGNOSIS — E55.9 VITAMIN D DEFICIENCY: ICD-10-CM

## 2025-08-21 DIAGNOSIS — Z13.1 DIABETES MELLITUS SCREENING: ICD-10-CM

## 2025-08-21 DIAGNOSIS — Z13.220 SCREENING, LIPID: ICD-10-CM

## 2025-08-21 DIAGNOSIS — Z11.59 NEED FOR HEPATITIS C SCREENING TEST: ICD-10-CM

## 2025-08-21 DIAGNOSIS — M54.2 NECK PAIN: ICD-10-CM

## 2025-08-21 DIAGNOSIS — Z76.89 ENCOUNTER TO ESTABLISH CARE WITH NEW PROVIDER: ICD-10-CM

## 2025-08-21 DIAGNOSIS — M62.81 MUSCLE WEAKNESS: ICD-10-CM

## 2025-08-21 DIAGNOSIS — R20.2 NUMBNESS AND TINGLING: ICD-10-CM

## 2025-08-21 DIAGNOSIS — R20.0 NUMBNESS AND TINGLING: ICD-10-CM

## 2025-08-21 LAB
BASOPHILS ABSOLUTE: 0 /ΜL
BASOPHILS RELATIVE PERCENT: 0.3 %
EOSINOPHILS ABSOLUTE: 0 /ΜL
EOSINOPHILS RELATIVE PERCENT: 0.1 %
HCT VFR BLD CALC: 45.8 % (ref 41–53)
HEMOGLOBIN: 16 G/DL (ref 13.5–17.5)
LYMPHOCYTES ABSOLUTE: 2.2 /ΜL
LYMPHOCYTES RELATIVE PERCENT: 23.3 %
MCH RBC QN AUTO: 31 PG
MCHC RBC AUTO-ENTMCNC: 34.9 G/DL
MCV RBC AUTO: 89 FL
MONOCYTES ABSOLUTE: 0.4 /ΜL
MONOCYTES RELATIVE PERCENT: 3.8 %
NEUTROPHILS ABSOLUTE: 6.8 /ΜL
NEUTROPHILS RELATIVE PERCENT: 72.5 %
PDW BLD-RTO: 12.7 %
PLATELET # BLD: 241 K/ΜL
PMV BLD AUTO: 8.5 FL
RBC # BLD: 5.15 10^6/ΜL
WBC # BLD: 9.4 10^3/ML

## 2025-08-21 PROCEDURE — 72125 CT NECK SPINE W/O DYE: CPT

## 2025-08-27 ENCOUNTER — HOSPITAL ENCOUNTER (EMERGENCY)
Facility: CLINIC | Age: 25
Discharge: HOME | End: 2025-08-27
Attending: EMERGENCY MEDICINE

## 2025-08-27 VITALS
SYSTOLIC BLOOD PRESSURE: 126 MMHG | TEMPERATURE: 98.7 F | RESPIRATION RATE: 15 BRPM | WEIGHT: 167 LBS | HEART RATE: 73 BPM | DIASTOLIC BLOOD PRESSURE: 64 MMHG | OXYGEN SATURATION: 99 % | BODY MASS INDEX: 24.66 KG/M2

## 2025-08-27 DIAGNOSIS — S40.021A CONTUSION OF RIGHT UPPER EXTREMITY, INITIAL ENCOUNTER: Primary | ICD-10-CM

## 2025-08-27 ASSESSMENT — PAIN SCALES - GENERAL: PAINLEVEL_OUTOF10: 0

## 2025-08-28 ENCOUNTER — CLINICAL DOCUMENTATION (OUTPATIENT)
Dept: FAMILY MEDICINE CLINIC | Age: 25
End: 2025-08-28

## 2025-09-02 ENCOUNTER — OFFICE VISIT (OUTPATIENT)
Dept: FAMILY MEDICINE CLINIC | Age: 25
End: 2025-09-02
Payer: MEDICAID

## 2025-09-02 VITALS
WEIGHT: 166 LBS | HEART RATE: 72 BPM | SYSTOLIC BLOOD PRESSURE: 122 MMHG | HEIGHT: 69 IN | BODY MASS INDEX: 24.59 KG/M2 | DIASTOLIC BLOOD PRESSURE: 62 MMHG | OXYGEN SATURATION: 98 %

## 2025-09-02 DIAGNOSIS — R55 NEUROCARDIOGENIC SYNCOPE: Primary | ICD-10-CM

## 2025-09-02 DIAGNOSIS — E55.9 VITAMIN D DEFICIENCY: ICD-10-CM

## 2025-09-02 PROCEDURE — 99214 OFFICE O/P EST MOD 30 MIN: CPT

## 2025-09-02 ASSESSMENT — PATIENT HEALTH QUESTIONNAIRE - PHQ9
SUM OF ALL RESPONSES TO PHQ QUESTIONS 1-9: 0
1. LITTLE INTEREST OR PLEASURE IN DOING THINGS: NOT AT ALL
2. FEELING DOWN, DEPRESSED OR HOPELESS: NOT AT ALL
SUM OF ALL RESPONSES TO PHQ QUESTIONS 1-9: 0

## 2025-09-02 ASSESSMENT — ENCOUNTER SYMPTOMS
SHORTNESS OF BREATH: 0
BACK PAIN: 1
COUGH: 0
ABDOMINAL PAIN: 0

## (undated) DEVICE — FORCEP REPROC BIOP HOT 2.8MM MIN WORK CHANL RADL JAW 4

## (undated) DEVICE — GLOVE ORANGE PI 8   MSG9080

## (undated) DEVICE — Device: Brand: DISPOSABLE BIOPSY FORCEPS